# Patient Record
Sex: MALE | Race: WHITE | NOT HISPANIC OR LATINO | Employment: FULL TIME | URBAN - NONMETROPOLITAN AREA
[De-identification: names, ages, dates, MRNs, and addresses within clinical notes are randomized per-mention and may not be internally consistent; named-entity substitution may affect disease eponyms.]

---

## 2017-09-11 ENCOUNTER — HISTORY (OUTPATIENT)
Dept: FAMILY MEDICINE | Facility: OTHER | Age: 42
End: 2017-09-11

## 2017-09-11 ENCOUNTER — OFFICE VISIT - GICH (OUTPATIENT)
Dept: FAMILY MEDICINE | Facility: OTHER | Age: 42
End: 2017-09-11

## 2017-09-11 DIAGNOSIS — Z13.220 ENCOUNTER FOR SCREENING FOR LIPOID DISORDERS: ICD-10-CM

## 2017-09-11 DIAGNOSIS — Z00.00 ENCOUNTER FOR GENERAL ADULT MEDICAL EXAMINATION WITHOUT ABNORMAL FINDINGS: ICD-10-CM

## 2017-09-11 DIAGNOSIS — Z87.898 PERSONAL HISTORY OF OTHER SPECIFIED CONDITIONS (CODE): ICD-10-CM

## 2017-09-11 LAB
A/G RATIO - HISTORICAL: 1.7 (ref 1–2)
ALBUMIN SERPL-MCNC: 4.3 G/DL (ref 3.5–5.7)
ALP SERPL-CCNC: 56 IU/L (ref 34–104)
ALT (SGPT) - HISTORICAL: 27 IU/L (ref 7–52)
ANION GAP - HISTORICAL: 10 (ref 5–18)
AST SERPL-CCNC: 20 IU/L (ref 13–39)
BILIRUB SERPL-MCNC: 0.5 MG/DL (ref 0.3–1)
BUN SERPL-MCNC: 13 MG/DL (ref 7–25)
BUN/CREAT RATIO - HISTORICAL: 16
CALCIUM SERPL-MCNC: 9.6 MG/DL (ref 8.6–10.3)
CHLORIDE SERPLBLD-SCNC: 105 MMOL/L (ref 98–107)
CHOL/HDL RATIO - HISTORICAL: 3.63
CHOLESTEROL TOTAL: 196 MG/DL
CO2 SERPL-SCNC: 23 MMOL/L (ref 21–31)
CREAT SERPL-MCNC: 0.8 MG/DL (ref 0.7–1.3)
GFR IF NOT AFRICAN AMERICAN - HISTORICAL: >60 ML/MIN/1.73M2
GLOBULIN - HISTORICAL: 2.6 G/DL (ref 2–3.7)
GLUCOSE SERPL-MCNC: 121 MG/DL (ref 70–105)
HDLC SERPL-MCNC: 54 MG/DL (ref 23–92)
LDLC SERPL CALC-MCNC: 118 MG/DL
NON-HDL CHOLESTEROL - HISTORICAL: 142 MG/DL
PATIENT STATUS - HISTORICAL: ABNORMAL
POTASSIUM SERPL-SCNC: 3.5 MMOL/L (ref 3.5–5.1)
PROT SERPL-MCNC: 6.9 G/DL (ref 6.4–8.9)
SODIUM SERPL-SCNC: 138 MMOL/L (ref 133–143)
TRIGL SERPL-MCNC: 122 MG/DL

## 2017-09-16 ENCOUNTER — HISTORY (OUTPATIENT)
Dept: FAMILY MEDICINE | Facility: OTHER | Age: 42
End: 2017-09-16

## 2017-09-20 ENCOUNTER — OFFICE VISIT - GICH (OUTPATIENT)
Dept: UROLOGY | Facility: OTHER | Age: 42
End: 2017-09-20

## 2017-09-20 ENCOUNTER — HISTORY (OUTPATIENT)
Dept: UROLOGY | Facility: OTHER | Age: 42
End: 2017-09-20

## 2017-09-20 DIAGNOSIS — Z30.09 ENCOUNTER FOR OTHER GENERAL COUNSELING AND ADVICE ON CONTRACEPTION: ICD-10-CM

## 2017-11-17 ENCOUNTER — HISTORY (OUTPATIENT)
Dept: UROLOGY | Facility: OTHER | Age: 42
End: 2017-11-17

## 2017-11-17 ENCOUNTER — AMBULATORY - GICH (OUTPATIENT)
Dept: UROLOGY | Facility: OTHER | Age: 42
End: 2017-11-17

## 2017-11-17 DIAGNOSIS — Z30.2 ENCOUNTER FOR STERILIZATION: ICD-10-CM

## 2017-12-06 ENCOUNTER — COMMUNICATION - GICH (OUTPATIENT)
Dept: FAMILY MEDICINE | Facility: OTHER | Age: 42
End: 2017-12-06

## 2017-12-07 ENCOUNTER — HISTORY (OUTPATIENT)
Dept: FAMILY MEDICINE | Facility: OTHER | Age: 42
End: 2017-12-07

## 2017-12-07 ENCOUNTER — OFFICE VISIT - GICH (OUTPATIENT)
Dept: FAMILY MEDICINE | Facility: OTHER | Age: 42
End: 2017-12-07

## 2017-12-07 DIAGNOSIS — J01.10 ACUTE FRONTAL SINUSITIS: ICD-10-CM

## 2017-12-27 NOTE — PROGRESS NOTES
Patient Information     Patient Name MRN Sex     Salomón Edwards [id number] Male 1975      Progress Notes by Humble Arroyo MD at 2017  3:30 PM     Author:  Humble Arroyo MD  Service:  (none) Author Type:  Physician     Filed:  2017  3:25 PM  Encounter Date:  2017 Status:  Addendum     :  Humble Arroyo MD (Physician)        Related Notes: Original Note by Humble Arroyo MD (Physician) filed at 2017  3:19 PM            SUBJECTIVE:  Salomón Edwards is a 42 y.o. male who presents for a physical.     Some shoulder pain lately on R. No known injury. He has been taking it easy. Not trying anything else to make it better. Bench pressing seems to make it worse.    Concerned about liver enzymes due to history of alcohol use. He feels his past use has been excessive. Decided to stop and spoke to siblings about it as well given that father was an alcoholic.      PAST MEDICAL HISTORY:  Past Medical History:     Diagnosis  Date     Chronic sprain or strain of lumbar region     hx of flares      Pneumonitis 05     Talus fracture 6/15/04    Question of      SURGICAL HISTORY:  Past Surgical History:      Procedure  Laterality Date     INGUINAL HERNIA REPAIR Left      WISDOM TEETH EXTRACTION         SOCIAL HISTORY:  Social History     Social History        Marital status:       Spouse name: N/A     Number of children:  N/A     Years of education:  N/A     Occupational History      Not on file.     Social History Main Topics        Smoking status:  Former Smoker     Quit date:      Smokeless tobacco:  Never Used     Alcohol use  Not on file     Drug use:  Not on file     Sexual activity:  Not on file     Other Topics  Concern     Not on file      Social History Narrative     .  Lives in . 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    Working on masters of business     Wife - WIllow    Son - Suresh 2007    Son - Tc 2009    Daughter - Sharlene 2017      "        FAMILY HISTORY:  Family History       Problem   Relation Age of Onset     Heart Disease  Father 65     CAD with stents       Alcohol/Drug  Father      Smoker       Cancer  Father      Lymphoma-in remission       Other  Father      Overweight       Liver disease  Father      Heart Disease  Maternal Grandfather 55     MI       Alcohol/Drug  Maternal Grandfather      Smoker       Other  Mother      Fibromyalgia/Scoliosis/Low back DJD       No Known Problems  Sister      No Known Problems  Brother      Diabetes  No Family History      Cancer-colon  No Family History       CURRENT MEDICATIONS:   No current outpatient prescriptions on file.     No current facility-administered medications for this visit.      Medications have been reviewed by me and are current to the best of my knowledge and ability.    ALLERGIES:  Review of patient's allergies indicates no known allergies.    REVIEW OF SYSTEMS:  General: Denies general constitutional problems  Eyes: Denies problems  Ears/Nose/Throat: Denies problems  Cardiovascular: Denies problems  Respiratory: Denies problems  Gastrointestinal: Denies problems  Genitourinary: Denies problems  Musculoskeletal: see above  Skin: Denies problems  Neurologic: Denies problems  Psychiatric: Denies problems    PHQ Depression Screening 9/11/2017   Date of PHQ exam (doc flow) 9/11/2017   1. Lack of interest/pleasure 0 - Not at all   2. Feeling down/depressed 0 - Not at all   PHQ-2 TOTAL SCORE 0   Some recent data might be hidden       OBJECTIVE:  /79  Pulse 73  Ht 1.759 m (5' 9.25\")  Wt 74.8 kg (165 lb)  BMI 24.19 kg/m2  EXAM:   General Appearance: Pleasant, alert, appropriate appearance for age. No acute distress  Eye Exam:  Normal external eye, conjunctiva, lids, cornea. CIPRIANO.  Ear Exam: Normal TM's bilaterally. Normal auditory canals and external ears. Non-tender.  OroPharynx Exam:  Dental hygiene adequate. Normal buccal mucose. Normal pharynx.  Neck Exam:  Supple, no " masses or nodes.  Thyroid Exam: No nodules or enlargement.  Chest/Respiratory Exam: Normal chest wall and respirations. Clear to auscultation.  Cardiovascular Exam: Regular rate and rhythm. S1, S2, no murmur, click, gallop, or rubs.  Gastrointestinal Exam: Soft, non-tender, no masses or organomegaly.  Musculoskeletal Exam: Tender in R anterior shoulder near pectoralis, not tender over biceps tendon  Foot Exam: Left and right foot: good pedal pulses.  Skin: no rash or abnormalities  Neurologic Exam: Nonfocal, symmetric DTRs, normal gross motor, tone coordination and no tremor.  Psychiatric Exam: Alert and oriented - appropriate affect.    Results for orders placed or performed in visit on 09/11/17      COMP METABOLIC PANEL      Result  Value Ref Range    SODIUM 138 133 - 143 mmol/L    POTASSIUM 3.5 3.5 - 5.1 mmol/L    CHLORIDE 105 98 - 107 mmol/L    CO2,TOTAL 23 21 - 31 mmol/L    ANION GAP 10 5 - 18                    GLUCOSE 121 (H) 70 - 105 mg/dL    CALCIUM 9.6 8.6 - 10.3 mg/dL    BUN 13 7 - 25 mg/dL    CREATININE 0.80 0.70 - 1.30 mg/dL    BUN/CREAT RATIO           16                    GFR if African American >60 >60 ml/min/1.73m2    GFR if not African American >60 >60 ml/min/1.73m2    ALBUMIN 4.3 3.5 - 5.7 g/dL    PROTEIN,TOTAL 6.9 6.4 - 8.9 g/dL    GLOBULIN                  2.6 2.0 - 3.7 g/dL    A/G RATIO 1.7 1.0 - 2.0                    BILIRUBIN,TOTAL 0.5 0.3 - 1.0 mg/dL    ALK PHOSPHATASE 56 34 - 104 IU/L    ALT (SGPT) 27 7 - 52 IU/L    AST (SGOT) 20 13 - 39 IU/L   LIPID PANEL      Result  Value Ref Range    CHOLESTEROL,TOTAL 196 <200 mg/dL    TRIGLYCERIDES 122 <150 mg/dL    HDL CHOLESTEROL 54 23 - 92 mg/dL    NON-HDL CHOLESTEROL 142 <145 mg/dl    CHOL/HDL RATIO            3.63 <4.50                    LDL CHOLESTEROL 118 (H) <100 mg/dL    PATIENT STATUS            NON-FASTING                      ASSESSMENT/PLAN:    ICD-10-CM    1. Annual physical exam Z00.00    2. History of alcohol use Z87.898 COMP  METABOLIC PANEL      COMP METABOLIC PANEL   3. Screening, lipid Z13.220 LIPID PANEL      LIPID PANEL     Mr. Edwards's Body mass index is 24.19 kg/(m^2). This is within the normal range for a 42 y.o. Normal range for ages 18+ is between 18.5 and 24.9.    BP Readings from Last 1 Encounters:09/11/17 : 125/79  Mr. Lui blood pressure is out of the normal range for adults. Per JNC-8 guidelines normal adult blood pressure is < 120/80, pre-hypertensive is between 120/80 and 139/89, and hypertension is 140/90 or greater. Risks of hypertension were discussed. Patient's strategy will be to recheck blood pressure in 12 months    HM current. He would like to check lipids since it has been a few years. Results look good,, no need for statin. Recheck 5 years, sooner if desired.     Ordered CMP as well given concerns about liver enzymes. Returned normal as expected.    Demonstrated some stretches for the pectoralis area. Gentle stretching and ROM will help.

## 2017-12-27 NOTE — PROGRESS NOTES
Patient Information     Patient Name MRN Sex     Salomón Edwards [id number] Male 1975      Progress Notes by Dmitri Yanez MD at 2017  8:45 AM     Author:  Dmitri Yanez MD Service:  (none) Author Type:  Physician     Filed:  2017  9:46 AM Encounter Date:  2017 Status:  Signed     :  Dmitri Yanez MD (Physician)            Type of Visit  NPV    Chief Complaint  Elective sterilization    HPI  Mr. Edwards is a 42 y.o. male who presents with desire for irreversible, elective sterilization.    He has 3 kids.    His wife is supportive of this decision.    He has been considering this decision for 2 years.  He denies erectile dysfunction.      Past Medical History  He  has a past medical history of Chronic sprain or strain of lumbar region; Pneumonitis (05); and Talus fracture (6/15/04).  Patient Active Problem List     Diagnosis  Code     Chronic sprain or strain of lumbar region [id number]       Past Surgical History  He  has a past surgical history that includes wisdom teeth extraction and Inguinal hernia repair (Left).    Medications  He has a current medication list which includes the following prescription(s): hydrocodone-acetaminophen (5-325 mg).    Allergies  No Known Allergies    Social History  He  reports that he quit smoking about 2 years ago. He has never used smokeless tobacco.  No drug abuse.    Family History  Family History       Problem   Relation Age of Onset     Heart Disease  Father 65     CAD with stents       Alcohol/Drug  Father      Smoker       Cancer  Father      Lymphoma-in remission       Other  Father      Overweight       Liver disease  Father      Heart Disease  Maternal Grandfather 55     MI       Alcohol/Drug  Maternal Grandfather      Smoker       Other  Mother      Fibromyalgia/Scoliosis/Low back DJD       No Known Problems  Sister      No Known Problems  Brother      Diabetes  No Family History      Cancer-colon  No Family History        Review of  Systems  I personally reviewed the ROS with the patient.    Nursing Notes:   Tamra Ramirez  9/20/2017  8:56 AM  Unsigned  Patient presents to the clinic for vas consult.  Tamra Ramirez LPN........................9/20/2017  8:56 AM    Review of Systems:    Weight loss:    No     Recent fever/chills:  No   Night sweats:   No  Current skin rash:  No   Recent hair loss:  No  Heat intolerance:  No   Cold intolerance:  No  Chest pain:   No   Palpitations:   No  Shortness of breath:  No   Wheezing:   No  Constipation:    No   Diarrhea:   No   Nausea:   No   Vomiting:   No   Kidney/side pain:  No   Back pain:   yes  Frequent headaches:  No   Dizziness:     No  Leg swelling:   No   Calf pain:    No    Parents, brothers or sisters with history of kidney cancer?   No  Parents, brothers or sisters with history of bladder cancer: No      Physical Exam  Vitals:     09/20/17 0857   BP: 126/64   Pulse: 68   Weight: 76.1 kg (167 lb 12.8 oz)     Constitutional: NAD, WDWN.   Head: NCAT  Eyes: Conjunctivae normal  Cardiovascular: Regular rate.  Pulmonary/Chest: Respirations are even and non-labored bilaterally.  Abdominal: Soft. No distension, tenderness, masses or guarding. No CVA tenderness.  Neurological: A + O x 3.  Cranial Nerves II-XII grossly intact.  Extremities: WOJCIECH x 4, Warm. No clubbing.  No cyanosis.    Skin: Pink, warm and dry.  No rashes noted.  Psychiatric:  Normal mood and affect  Genitourinary:  Phallus normal without lesions, testicles descended bilaterally, no masses.    Bilateral vasa palpable    Assessment  Mr. Edwards is a 42 y.o. male who presents today requesting permanent, irreversible surgical sterilization.  He was instructed to trim his pubic hair the night prior with a clippers.  The vasectomy was discussed in detail with the patient today including the risks which are failure of the procedure, infection, bleeding and/or development of chronic testicular pain.      Furthermore, the patient was told he  would remain fertile following the procedure until he provided a semen analysis that met the definition of infertile (no sperm present or <100,000 nonmotile sperm/mL)  This is usually planned for 3 months after the procedure.  The patient expressed understanding and desire to proceed.    Plan  Rx for Norco prior to and after the procedure.     He understands he needs a  the day of the procedure if he chooses to take the narcotic.  Provided vasectomy hand out again outlining the above risks and benefits as well as need for follow up semen analysis

## 2017-12-28 NOTE — PROGRESS NOTES
Patient Information     Patient Name MRN Sex     Salomón Edwards [id number] Male 1975      Progress Notes by Dmitri Yanez MD at 2017 11:15 AM     Author:  Dmitri Yanez MD Service:  (none) Author Type:  Physician     Filed:  2017 12:36 PM Encounter Date:  2017 Status:  Signed     :  Dmitri Yanez MD (Physician)            Preoperative diagnosis  Elective sterilization    Postoperative diagnosis  Elective sterilization    Procedure performed  Bilateral vasectomy    Surgeon  Dmitri Yanez MD    Anesthesia  8 mL lidocaine 2% local injection    Complications  None    EBL  <1 mL    Specimen  Left vas  Right vas    Indications  42 y.o. male agreed to undergo the above named procedure after discussion of the alternatives, risks and benefits.  Informed consent was obtained.      Procedure  The patient was brought to the procedure room and placed in supine position.  His scrotum was prepped with Hibiclens and he was draped in a sterile fashion.  A timeout was performed.    Lidocaine 2% was used to anesthetize the scrotal skin as well as perivasal sheath initially on the patient's left.  A 1 cm skin incision was created with the sharp-tip mosquito and a vas clamp utilized through this incision to grasp the vas.  The left vas was elevated to the skin surface and dissected free of its perivasal sheath.  The vas was transected and the lumen was cauterized both proximally and distally.  A 4-0 chromic suture was utilized to place the proximal vasal portion under the perivasal sheath, such that the 2 ends were divided by the perivasal sheath (fascial interposition).  This portion of the vas was then allowed to drop back into the left hemiscrotum.  The right side was treated next in the same manner as described above.  The skin incision was closed with the 4-0 chromic suture.  The patient tolerated the procedure well.    It was again reiterated to the patient that he would remain fertile for sometime  and should present to the office for a post-vacectomy semen analysis to confirm sterility in 3 months.  The patient again expressed understanding.

## 2017-12-29 NOTE — PATIENT INSTRUCTIONS
Patient Information     Patient Name MRN Salomón Lepe [id number] Male 1975      Patient Instructions by Kerri Dsouza RN at 2017 11:15 AM     Author:  Kerri Dsouza RN Service:  (none) Author Type:  NURS- Registered Nurse     Filed:  2017 10:59 AM Encounter Date:  2017 Status:  Signed     :  Kerri Dsouza RN (NURS- Registered Nurse)            Home Care after Vasectomy   Follow these guidelines for your care after your surgery to help your recovery.    Activity  Limit your activity for the first 5 days after the procedure to light activity.  You may return to work typically in 2 days.  Limit lifting, pushing or pulling to less than 20 pounds until you are no longer sore.   Limit running and long walks until you are no longer sore.   Limit sexual activity for 5 days.    Swelling  Scrotal swelling from the surgery may take weeks to get better. You should call your doctor if the swelling is severe and the scrotum is larger than an orange.  Apply a bag of frozen peas to the scrotum for 15 minutes every 1-2 hours for the first 48 hours when you are awake to limit swelling. It works best to not apply the bag of frozen peas directly to the skin.  Wear a jock strap to support your scrotum and reduce swelling.  Continue wearing the jock strap until you are no longer sore, 1-2 weeks.    Incision care  The single stitch placed in the scrotum will dissolve and does not need to be removed.  A small amount of blood may stain the dressings for up to 2-3 days after the procedure.  For the first few days, apply two or three gauze pads to the site each day and as needed to keep the dressing dry. This will protect the incision and help keep your clothes clean.  When you are no longer having any drainage, stop using the gauze pads over the site.    Bathing or showering  You may shower after the procedure but do not scrub the stitch area.  Allow the water to wash over the stitch  and do not scrub the area. Dry the site gently by patting it with a clean towel.  Tub baths should be avoided for 7 days after surgery.  Swimming should be avoided for 14 days after surgery.      Pain control  You were provided with a pain medication prescription during the clinic consultation, please use this as needed.  Also, please take ibuprofen as we discussed in clinic.  Pain most often is eased after 5 to 7 days.    Sexual activity  Please avoid ejaculating for 1 week after procedure.    Follow up  Following this procedure you are still considered fertile and would be able to impregnate your partner.  You should have a semen analysis performed 3 months or greater after your procedure to confirm sterility.  Ideally you would ejaculate about 2-3 dozen times following the vasectomy and prior to semen collection.  Use birth control until the semen analysis is confirmed sterile.    Use contraceptives until this is confirmed to prevent pregnancy.    Contacts  General Questions: [phone]  Appointments:  [phone]  Emergencies:  [phone]    When to call your doctor  Call the urology office if you have any of the following:   Severe swelling, larger than the size of an orange    A large amount of fluid drainage that soaks several pads per day   Pain that is not controlled with pain medicine, jock strap and use of frozen peas   Any signs of infection such as the following:    -Redness or tenderness around the incision site worsening after 2-3 days or   -Pus type drainage from the incision or   -Fever of greater than 101 degrees F    Also call the office if you have any questions or concerns about your care.

## 2017-12-29 NOTE — PATIENT INSTRUCTIONS
Patient Information     Patient Name MRN Sex Salomón Green [id number] Male 1975      Patient Instructions by Humble Arroyo MD at 2017  3:30 PM     Author:  Humble Arroyo MD Service:  (none) Author Type:  Physician     Filed:  2017  3:28 PM Encounter Date:  2017 Status:  Signed     :  Humble Arroyo MD (Physician)            Letter with labs  Tetanus shot due in 7 years

## 2017-12-30 NOTE — NURSING NOTE
Patient Information     Patient Name MRN Sex     Salomón Edwards [id number] Male 1975      Nursing Note by Kerri Dsouza RN at 2017 11:15 AM     Author:  Kerri Dsouza RN Service:  (none) Author Type:  NURS- Registered Nurse     Filed:  2017 11:47 AM Encounter Date:  2017 Status:  Signed     :  Kerri Dsouza RN (NURS- Registered Nurse)            Vasectomy  Per verbal order read back by Dmitri Yanez MD to prep patient for vasectomy.  Patient positioned in supine position, perineum area prepped with chlorhexidene Gluconate and patient draped per sterile technique.    Lidocaine 2%  Lot #: [id number]  Expiration date: 2019  : [employer]  NDC: 7773-3573-16    [place] Protocol    A. Pre-procedure verification complete yes  1-relevant information / documentation available, reviewed and properly matched to the patient; 2-consent accurate and complete, 3-equipment and supplies available    B. Site marking complete N/A  Site marked if not in continuous attendance with patient    C. TIME OUT completed yes  Time Out was conducted just prior to starting procedure to verify the eight required elements: 1-patient identity, 2-consent accurate and complete, 3-position, 4-correct side/site marked (if applicable), 5-procedure, 6-relevant images / results properly labeled and displayed (if applicable), 7-antibiotics / irrigation fluids (if applicable), 8-safety precautions.    After procedure perineum area rinsed. Semen analysis container given to patient. Patient reminded to have a semen analysis performed 3 months after the procedure to confirm sterility and to ejaculate about 1-2 dozen times following the vasectomy and prior to semen collection. Discharge instructions reviewed with patient. Patient verbalized understanding of discharge instructions and discharged ambulatory.

## 2017-12-30 NOTE — NURSING NOTE
Patient Information     Patient Name MRN Sex Salomón Green [id number] Male 1975      Nursing Note by Tamra Ramirez at 2017  8:45 AM     Author:  Tamra Ramirez Service:  (none) Author Type:  (none)     Filed:  2017  9:09 AM Encounter Date:  2017 Status:  Signed     :  Tamra Ramirez            Patient presents to the clinic for vas consult.  Tamra Ramirez LPN........................2017  8:56 AM    Review of Systems:    Weight loss:    No     Recent fever/chills:  No   Night sweats:   No  Current skin rash:  No   Recent hair loss:  No  Heat intolerance:  No   Cold intolerance:  No  Chest pain:   No   Palpitations:   No  Shortness of breath:  No   Wheezing:   No  Constipation:    No   Diarrhea:   No   Nausea:   No   Vomiting:   No   Kidney/side pain:  No   Back pain:   yes  Frequent headaches:  No   Dizziness:     No  Leg swelling:   No   Calf pain:    No    Parents, brothers or sisters with history of kidney cancer?   No  Parents, brothers or sisters with history of bladder cancer: No

## 2017-12-30 NOTE — NURSING NOTE
Patient Information     Patient Name MRN Sex Salomón Green [id number] Male 1975      Nursing Note by Griselda Gurrola at 2017  3:30 PM     Author:  Griselda Gurrola Service:  (none) Author Type:  (none)     Filed:  2017  2:58 PM Encounter Date:  2017 Status:  Signed     :  Griselda Gurrola            Salomón Edwards is a 42 y.o. male presenting for a physical.  Griselda Gurrola LPN 2017 2:49 PM

## 2018-01-25 VITALS
RESPIRATION RATE: 16 BRPM | HEIGHT: 70 IN | HEART RATE: 73 BPM | WEIGHT: 165 LBS | DIASTOLIC BLOOD PRESSURE: 79 MMHG | HEART RATE: 76 BPM | BODY MASS INDEX: 24.28 KG/M2 | BODY MASS INDEX: 24.44 KG/M2 | SYSTOLIC BLOOD PRESSURE: 125 MMHG | WEIGHT: 169.6 LBS | HEIGHT: 69 IN

## 2018-01-25 VITALS — HEART RATE: 68 BPM | SYSTOLIC BLOOD PRESSURE: 126 MMHG | WEIGHT: 167.8 LBS | DIASTOLIC BLOOD PRESSURE: 64 MMHG

## 2018-02-08 DIAGNOSIS — Z30.2 ENCOUNTER FOR VASECTOMY: Primary | ICD-10-CM

## 2018-02-09 VITALS
DIASTOLIC BLOOD PRESSURE: 82 MMHG | TEMPERATURE: 97.8 F | HEART RATE: 76 BPM | BODY MASS INDEX: 24.39 KG/M2 | HEIGHT: 70 IN | WEIGHT: 170.38 LBS | SYSTOLIC BLOOD PRESSURE: 136 MMHG

## 2018-02-12 NOTE — PATIENT INSTRUCTIONS
Patient Information     Patient Name MRN Sex Salomón Silva [id number] Male 1975      Patient Instructions by Aura Lyman NP at 2017 10:35 AM     Author:  Aura Lyman NP  Service:  (none) Author Type:  PHYS- Nurse Practitioner     Filed:  2017 10:35 AM  Encounter Date:  2017 Status:  Addendum     :  Aura Lyman NP (PHYS- Nurse Practitioner)        Related Notes: Original Note by Aura Lyman NP (PHYS- Nurse Practitioner) filed at 2017 10:35 AM               Index Telugu Related topics   Sinusitis   ________________________________________________________________________  KEY POINTS    Sinusitis is swelling and irritation of the linings of the sinuses, the hollow spaces in the bones of your face and front of your skull.    You may need to take medicine for your stuffy nose, pain, infection, or swelling.    Use saline nasal sprays or rinses to help wash out nasal passages if you have a sinus infection. A humidifier can add moisture to the air also.  ________________________________________________________________________  What is sinusitis?  Sinusitis is swelling and irritation of the linings of the sinuses. The sinuses are hollow spaces in the bones of your face and front of your skull. They connect with the nose through small openings. Like the nose, they are lined with tissue (membranes) that make mucus. Mucus drains through the small openings to the nose.  What is the cause?  The passageways from the sinuses to the nose are very narrow. When drainage of mucus from the sinuses is blocked, the sinuses get swollen and irritated. They may also become infected with bacteria, a virus, or even fungus. Allergies or irritation from pollen, mold, dust, or smoke can also cause swelling of the sinuses. Sometimes a tooth infection spreads to the sinuses.  You may be more likely to get sinus congestion and infections if you have:    Severe or untreated seasonal or year-round  allergies    Injured the bones in your nose    A deformity of the nose that causes the sinuses not to drain properly    Small growths called polyps in the sinuses that partially block the sinus openings  What are the symptoms?  Symptoms may include:    Feeling of fullness or pressure in your face or head    A headache that is most painful when you first wake up in the morning or when you bend over and put your head down    Pain in your face    Aching in the upper jaw and teeth    Runny or stuffy nose    Cough, especially at night    Fluid draining down the back of your throat (postnasal drip)    Sore throat, especially in the morning or evening  How is it diagnosed?  Your healthcare provider will ask about your symptoms and medical history and examine you. Tests are often not needed but may include:    X-ray of your sinuses    CT scan, which uses X-rays and a computer to show detailed pictures of the sinuses  How is it treated?  Several kinds of medicine may help:    Nonprescription pain medicine, such as acetaminophen, ibuprofen, or naproxen. Read the label and take as directed. Unless recommended by your healthcare provider, you should not take these medicines for more than 10 days.    Nonsteroidal anti-inflammatory medicines (NSAIDs), such as ibuprofen, naproxen, and aspirin, may cause stomach bleeding and other problems. These risks increase with age.    Acetaminophen may cause liver damage or other problems. Unless recommended by your provider, don't take more than 3000 milligrams (mg) in 24 hours. To make sure you don t take too much, check other medicines you take to see if they also contain acetaminophen. Ask your provider if you need to avoid drinking alcohol while taking this medicine.    Decongestants pills or nasal sprays to reduce swelling in your nose and sinuses and lessen the amount of mucus. Use decongestants as directed. If you are using a nonprescription nasal-spray decongestant, generally you  should not use it for more than 3 days. After 3 days it may make your symptoms worse. Ask your healthcare provider if it is OK for you to use a nasal spray decongestant longer than this.    Antihistamine tablets or a nasal spray to treat the allergies during your allergy season or, in some cases, year-round. Antihistamines block the effect of a chemical your body makes when you have an allergic reaction.    Antibiotics, if your provider thinks you might have a sinus infection    Steroid nasal spray if your provider thinks it may help clear your sinuses  If sinusitis is still a problem despite treatment, you may be referred to an allergy specialist or an ear, nose, and throat (ENT) specialist. The allergy specialist will check for and help treat your allergies to lessen the chance of having sinusitis. The ENT specialist will check for polyps or a deformed bone that may be blocking your sinuses. You may need surgery to create an extra or enlarged passageway to help your sinuses drain more easily.  Depending on what caused the sinusitis and how severe it is, it may last for days or weeks. For most cases of sinusitis, the symptoms get better gradually over 3 to 10 days.  How can I take care of myself?  Follow the full course of treatment prescribed by your healthcare provider. In addition:    If you are taking an antibiotic, take all of it as directed by your provider. If you stop taking the medicine when your symptoms are gone but before you have taken all of the medicine, symptoms may come back.    Don t smoke, and stay away from others who are smoking.    If you have allergies, try to avoid the things you are allergic to, like animal dander. Use medicine to keep your nose and sinuses open.    Use a humidifier to put more moisture in the air. This can help to open blocked sinuses and relieve pain. Avoid steam vaporizers because they can cause burns. Be sure to keep the humidifier clean, as recommended in the  's instructions. It's important to keep bacteria and mold from growing in the water container.    Use saline nasal sprays or rinses to help wash out nasal passages if you have a sinus infection. You may use the sprays also to prevent infections.    Get plenty of rest.    Drink more fluids to keep the mucus as thin as possible so your sinuses can drain more easily.    Raise the head of your bed slightly or sleep on extra pillows to help your sinuses drain.    Put warm, moist cloths on painful areas.  Ask your healthcare provider:    How and when you will get your test results    How long it will take to recover    If there are activities you should avoid and when you can return to your normal activities    How to take care of yourself at home    What symptoms or problems you should watch for and what to do if you have them  Make sure you know when you should come back for a checkup. Keep all appointments for provider visits or tests.  How can I help prevent sinusitis?    Treat your colds and allergies promptly. Use decongestants as soon as you start having symptoms, and before you fly, travel to high altitudes, or swim in deep water.    If you smoke, try to quit. Talk to your healthcare provider about ways to quit smoking.  Developed by [employer].  Adult Advisor 2017.2 published by [employer].  Last modified: 2016-03-23  Last reviewed: 2015-08-27  This content is reviewed periodically and is subject to change as new health information becomes available. The information is intended to inform and educate and is not a replacement for medical evaluation, advice, diagnosis or treatment by a healthcare professional.  References   Adult Advisor 2017.2 Index    Copyright   2017 [employer], a division of [employer] All rights reserved.

## 2018-02-12 NOTE — PROGRESS NOTES
Patient Information     Patient Name MRN Sex     Salomón Edwards [id number] Male 1975      Progress Notes by Aura Lyman NP at 2017 10:00 AM     Author:  Aura Lyman NP Service:  (none) Author Type:  PHYS- Nurse Practitioner     Filed:  2017  2:47 PM Encounter Date:  2017 Status:  Signed     :  Aura Lyman NP (PHYS- Nurse Practitioner)            SUBJECTIVE:    Salomón Edwards is a 42 y.o. male who presents for follow-up on 6 weeks of sinus pain and pressure, has had sinus infections in the past and responded well to treatment with antibiotics.  His not used any nasal saline or nasal corticosteroid. Denies any recent colds. May have allergy issues. Denies any history of sinus infections. No fever.    HPI    No Known Allergies,   Family History       Problem   Relation Age of Onset     Heart Disease  Father 65     CAD with stents       Alcohol/Drug  Father      Smoker       Cancer  Father      Lymphoma-in remission       Other  Father      Overweight       Liver disease  Father      Heart Disease  Maternal Grandfather 55     MI       Alcohol/Drug  Maternal Grandfather      Smoker       Other  Mother      Fibromyalgia/Scoliosis/Low back DJD       No Known Problems  Sister      No Known Problems  Brother      Diabetes  No Family History      Cancer-colon  No Family History    ,   No current outpatient prescriptions on file prior to visit.     No current facility-administered medications on file prior to visit.    ,   Current Outpatient Prescriptions:      cefpodoxime (VANTIN) 200 mg tablet, Take 1 tablet by mouth 2 times daily for 10 days., Disp: 20 tablet, Rfl: 0     fluticasone (50 mcg per actuation) nasal solution (FLONASE), Inhale 2 Sprays into both nostrils once daily., Disp: 1 Bottle, Rfl: 0     ibuprofen (ADVIL; MOTRIN) 800 mg tablet, Take 800 mg by mouth 3 times daily if needed., Disp: , Rfl: 0  Medications have been reviewed by me and are current to the best of my  "knowledge and ability.,   Past Medical History:     Diagnosis  Date     Chronic sprain or strain of lumbar region     hx of flares      Pneumonitis 2/17/05     Talus fracture 6/15/04    Question of    ,   Patient Active Problem List       Diagnosis  Date Noted     Chronic sprain or strain of lumbar region       hx of flares      ,   Past Surgical History:      Procedure  Laterality Date     INGUINAL HERNIA REPAIR Left      WISDOM TEETH EXTRACTION      and   Social History      Substance Use Topics        Smoking status:  Former Smoker     Quit date: 2015     Smokeless tobacco:  Never Used     Alcohol use  No       REVIEW OF SYSTEMS:  Review of Systems   Constitutional: Negative.    HENT: Positive for congestion and sinus pain.    Eyes: Negative.    Respiratory: Negative.    Cardiovascular: Negative.    Gastrointestinal: Negative.    Genitourinary: Negative.    Musculoskeletal: Negative.    Skin: Negative.    Neurological: Negative.    Endo/Heme/Allergies: Negative.    Psychiatric/Behavioral: Negative.        OBJECTIVE:  /82  Pulse 76  Temp 97.8  F (36.6  C) (Temporal)  Ht 1.77 m (5' 9.69\")  Wt 77.3 kg (170 lb 6 oz)  BMI 24.67 kg/m2    EXAM:   Physical Exam   Constitutional: He is oriented to person, place, and time and well-developed, well-nourished, and in no distress.   HENT:   Head: Normocephalic and atraumatic.   Mouth/Throat: Oropharynx is clear and moist.   Bilateral maxillary and frontal sinus pain and pressure no visible erythema or edema   Neck: Normal range of motion. Neck supple.   Cardiovascular: Normal rate.    Pulmonary/Chest: Effort normal.   Musculoskeletal: Normal range of motion.   Lymphadenopathy:     He has no cervical adenopathy.   Neurological: He is alert and oriented to person, place, and time. Gait normal.   Skin: Skin is warm and dry.   Psychiatric: Mood, memory, affect and judgment normal.   Nursing note and vitals reviewed.      ASSESSMENT/PLAN:    ICD-10-CM    1. Acute frontal " sinusitis, recurrence not specified J01.10 cefpodoxime (VANTIN) 200 mg tablet      fluticasone (50 mcg per actuation) nasal solution (FLONASE)        Plan:  We'll treat with cephalosporin    Encouraged nasal saline to promote flow    Try nasal corticosteroid for the next 2-3 weeks to decrease pressure and promote flow and drainage    Discussed expected course--- return to clinic if no improvement or if worsening

## 2018-02-12 NOTE — NURSING NOTE
"Patient Information     Patient Name MRN Salomón Lepe [id number] Male 1975      Nursing Note by Makenna Santiago at 2017 10:00 AM     Author:  Makenna Santiago Service:  (none) Author Type:  (none)     Filed:  2017 10:25 AM Encounter Date:  2017 Status:  Signed     :  Makenna Santiago            Patient presents to clinic today for sinus pressure x6 weeks. He states it started after he was on a long flight. He states it feels as though there is a \"block of wood\" on his head.    Makenna Santiago LPN...................2017  10:13 AM        "

## 2018-02-12 NOTE — TELEPHONE ENCOUNTER
"Patient Information     Patient Name MRN Sex Salomón Green [id number] Male 1975      Telephone Encounter by Amber Seo RN at 2017 12:09 PM     Author:  Amber Seo RN Service:  (none) Author Type:  NURS- Registered Nurse     Filed:  2017 12:23 PM Encounter Date:  2017 Status:  Signed     :  Amber Seo RN (NURS- Registered Nurse)            Patient calls, \"I have this thing going on for the past 6 weeks. It got better and then it came back. Pressure and heat on forehead. I do have a history of sinus infection. I used to get nose bleeds with sinus infections but I haven't this time. Denies fever. No headache. 'Just sinus pressure'. Recommended patient be seen today, however no available openings in clinic today. This writer recommended [facility] however patient states,[employer] insurance has a problem with covering a [facility] visit'. Patient transferred to scheduling to set up next available for tomorrow. Patient states he will see any provider. Patient to call back and or go to ED  if symptoms change/worsen in the interim. \"I will'. Amber Seo RN ....................  2017   12:23 PM          "

## 2018-02-16 ENCOUNTER — DOCUMENTATION ONLY (OUTPATIENT)
Dept: FAMILY MEDICINE | Facility: OTHER | Age: 43
End: 2018-02-16

## 2018-02-16 RX ORDER — IBUPROFEN 800 MG/1
800 TABLET, FILM COATED ORAL 3 TIMES DAILY PRN
COMMUNITY
Start: 2017-11-17 | End: 2019-01-15

## 2018-02-16 RX ORDER — FLUTICASONE PROPIONATE 50 MCG
2 SPRAY, SUSPENSION (ML) NASAL DAILY
COMMUNITY
Start: 2017-12-07 | End: 2019-01-15

## 2018-07-23 NOTE — PROGRESS NOTES
Patient Information     Patient Name  Salomón Edwards MRN  [id number] Sex  Male   1975      Letter by Humble Arroyo MD at      Author:  Humble Arroyo MD Service:  (none) Author Type:  (none)    Filed:   Encounter Date:  2017 Status:  (Other)           Salomón Edwards  [address]  [facility] [address]          2017    Dear Mr. Edwards:    Your labs are included below. Everything looks good. The liver tests, electrolytes, and kidney tests are normal. Your blood sugar is normal for a non-fasting value.     Cholesterol values look good. As we discussed, cholesterol should be rechecked in 5 years, although we can certainly check the cholesterol more frequently if you desire.    Please contact me if you have any questions.    Sincerely,      Humble Arroyo MD  Reviewed and electronically signed by provider.    Results for orders placed or performed in visit on 17      COMP METABOLIC PANEL      Result  Value Ref Range    SODIUM 138 133 - 143 mmol/L    POTASSIUM 3.5 3.5 - 5.1 mmol/L    CHLORIDE 105 98 - 107 mmol/L    CO2,TOTAL 23 21 - 31 mmol/L    ANION GAP 10 5 - 18                    GLUCOSE 121 70 - 105 mg/dL    CALCIUM 9.6 8.6 - 10.3 mg/dL    BUN 13 7 - 25 mg/dL    CREATININE 0.80 0.70 - 1.30 mg/dL    ALBUMIN 4.3 3.5 - 5.7 g/dL    PROTEIN,TOTAL 6.9 6.4 - 8.9 g/dL    GLOBULIN                  2.6 2.0 - 3.7 g/dL    BILIRUBIN,TOTAL 0.5 0.3 - 1.0 mg/dL    ALK PHOSPHATASE 56 34 - 104 IU/L    ALT (SGPT) 27 7 - 52 IU/L    AST (SGOT) 20 13 - 39 IU/L   LIPID PANEL      Result  Value Ref Range    CHOLESTEROL,TOTAL 196 <200 mg/dL    TRIGLYCERIDES 122 <150 mg/dL    HDL CHOLESTEROL 54 23 - 92 mg/dL    NON-HDL CHOLESTEROL 142 <145 mg/dl    CHOL/HDL RATIO            3.63 <4.50                    LDL CHOLESTEROL 118 <100 mg/dL    PATIENT STATUS            NON-FASTING

## 2019-01-15 ENCOUNTER — HOSPITAL ENCOUNTER (OUTPATIENT)
Dept: GENERAL RADIOLOGY | Facility: OTHER | Age: 44
Discharge: HOME | End: 2019-01-15
Attending: FAMILY MEDICINE | Admitting: FAMILY MEDICINE
Payer: COMMERCIAL

## 2019-01-15 ENCOUNTER — OFFICE VISIT (OUTPATIENT)
Dept: FAMILY MEDICINE | Facility: OTHER | Age: 44
End: 2019-01-15
Attending: FAMILY MEDICINE
Payer: COMMERCIAL

## 2019-01-15 VITALS
WEIGHT: 173.8 LBS | DIASTOLIC BLOOD PRESSURE: 88 MMHG | SYSTOLIC BLOOD PRESSURE: 128 MMHG | RESPIRATION RATE: 16 BRPM | BODY MASS INDEX: 25.16 KG/M2 | HEART RATE: 66 BPM

## 2019-01-15 DIAGNOSIS — M25.511 RIGHT ANTERIOR SHOULDER PAIN: Primary | ICD-10-CM

## 2019-01-15 DIAGNOSIS — M25.511 RIGHT ANTERIOR SHOULDER PAIN: ICD-10-CM

## 2019-01-15 DIAGNOSIS — M54.50 CHRONIC RIGHT-SIDED LOW BACK PAIN WITHOUT SCIATICA: ICD-10-CM

## 2019-01-15 DIAGNOSIS — G89.29 CHRONIC RIGHT-SIDED LOW BACK PAIN WITHOUT SCIATICA: ICD-10-CM

## 2019-01-15 PROCEDURE — 73030 X-RAY EXAM OF SHOULDER: CPT | Mod: RT

## 2019-01-15 PROCEDURE — 99214 OFFICE O/P EST MOD 30 MIN: CPT | Performed by: FAMILY MEDICINE

## 2019-01-15 ASSESSMENT — PAIN SCALES - GENERAL: PAINLEVEL: MILD PAIN (2)

## 2019-01-15 NOTE — PATIENT INSTRUCTIONS
Ordered MRI to check on shoulder  Ordered MRI to assess lumbar pain  Likely physical therapy depending on results

## 2019-01-15 NOTE — NURSING NOTE
Pt presents to clinic today for ongoing right shoulder pain. Pt states no improvement over the last year.      Medication Reconciliation: complete  Kerri Ayers LPN
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
## 2019-01-17 ENCOUNTER — HOSPITAL ENCOUNTER (OUTPATIENT)
Dept: GENERAL RADIOLOGY | Facility: OTHER | Age: 44
End: 2019-01-17
Attending: FAMILY MEDICINE
Payer: COMMERCIAL

## 2019-01-17 ENCOUNTER — HOSPITAL ENCOUNTER (OUTPATIENT)
Dept: MRI IMAGING | Facility: OTHER | Age: 44
End: 2019-01-17
Attending: FAMILY MEDICINE
Payer: COMMERCIAL

## 2019-01-17 ENCOUNTER — HOSPITAL ENCOUNTER (OUTPATIENT)
Dept: MRI IMAGING | Facility: OTHER | Age: 44
Discharge: HOME | End: 2019-01-17
Attending: FAMILY MEDICINE | Admitting: RADIOLOGY
Payer: COMMERCIAL

## 2019-01-17 DIAGNOSIS — M54.50 CHRONIC RIGHT-SIDED LOW BACK PAIN WITHOUT SCIATICA: ICD-10-CM

## 2019-01-17 DIAGNOSIS — M25.511 RIGHT ANTERIOR SHOULDER PAIN: ICD-10-CM

## 2019-01-17 DIAGNOSIS — G89.29 CHRONIC RIGHT-SIDED LOW BACK PAIN WITHOUT SCIATICA: ICD-10-CM

## 2019-01-17 PROCEDURE — 25000125 ZZHC RX 250: Performed by: RADIOLOGY

## 2019-01-17 PROCEDURE — 23350 INJECTION FOR SHOULDER X-RAY: CPT

## 2019-01-17 PROCEDURE — A9575 INJ GADOTERATE MEGLUMI 0.1ML: HCPCS | Performed by: RADIOLOGY

## 2019-01-17 PROCEDURE — 72148 MRI LUMBAR SPINE W/O DYE: CPT

## 2019-01-17 PROCEDURE — 73222 MRI JOINT UPR EXTREM W/DYE: CPT | Mod: RT

## 2019-01-17 PROCEDURE — 25500064 ZZH RX 255 OP 636: Performed by: RADIOLOGY

## 2019-01-17 RX ORDER — GADOTERATE MEGLUMINE 376.9 MG/ML
0.1 INJECTION INTRAVENOUS ONCE
Status: COMPLETED | OUTPATIENT
Start: 2019-01-17 | End: 2019-01-17

## 2019-01-17 RX ADMIN — LIDOCAINE HYDROCHLORIDE 5 ML: 10 INJECTION, SOLUTION INFILTRATION; PERINEURAL at 15:44

## 2019-01-17 RX ADMIN — Medication 5 ML: at 15:44

## 2019-01-17 RX ADMIN — GADOTERATE MEGLUMINE 0.1 ML: 376.9 INJECTION INTRAVENOUS at 15:44

## 2019-01-18 ENCOUNTER — TELEPHONE (OUTPATIENT)
Dept: FAMILY MEDICINE | Facility: OTHER | Age: 44
End: 2019-01-18

## 2019-01-18 DIAGNOSIS — M51.369 DDD (DEGENERATIVE DISC DISEASE), LUMBAR: Primary | ICD-10-CM

## 2019-01-18 DIAGNOSIS — M75.111 PARTIAL TEAR OF RIGHT ROTATOR CUFF: ICD-10-CM

## 2019-01-18 NOTE — TELEPHONE ENCOUNTER
Please let him know that MRI of the shoulder shows multiple partial rotator cuff tears and an old labral tear. He should see orthopedic surgery about surgery for these findings. PT may be indicated, but likely they can just perform surgery.    Back MRI shows significant narrowing of the vertebral space and some disc herniation mostly at L5-S1, less so at L4-5.This seems like a good explanation for his pain. I would try PT first. Placed referral. Injections could be considered vs discussion with a surgeon depending on how PT goes.     I will wait to sign results, if he signs up for MyChart I can release the results to him so he can read the findings this weekend.

## 2019-01-23 ENCOUNTER — HOSPITAL ENCOUNTER (OUTPATIENT)
Dept: PHYSICAL THERAPY | Facility: OTHER | Age: 44
Setting detail: THERAPIES SERIES
End: 2019-01-23
Attending: FAMILY MEDICINE
Payer: COMMERCIAL

## 2019-01-23 DIAGNOSIS — M51.369 DDD (DEGENERATIVE DISC DISEASE), LUMBAR: ICD-10-CM

## 2019-01-23 PROCEDURE — 97110 THERAPEUTIC EXERCISES: CPT | Mod: GP

## 2019-01-23 PROCEDURE — 97161 PT EVAL LOW COMPLEX 20 MIN: CPT | Mod: GP

## 2019-01-24 NOTE — PROGRESS NOTES
" 01/23/19 1000   General Information   Type of Visit Initial OP Ortho PT Evaluation   Start of Care Date 01/23/19   Referring Physician Dr. Arroyo   Orders Evaluate and Treat   Date of Order 01/18/19   Insurance Type Other   Insurance Comments/Visits Authorized [employer]   Medical Diagnosis Lumbar degenerative disc disease   Surgical/Medical history reviewed Yes   Precautions/Limitations no known precautions/limitations   Body Part(s)   Body Part(s) Lumbar Spine/SI   Presentation and Etiology   Pertinent history of current problem (include personal factors and/or comorbidities that impact the POC) Patient is a 43 y.o male whom presents to PT with c/o a long history of insidious onset LBP.. Patient is also having R shoulder pain. He will have an orthopedic consult with Dr. Burkett at [facility] in two weeks. He c/o a long history of LBP that has at times been both right and left side. Currently it is just the right side that is symptomatic. He is a very active individual and completes an aggressive work out routine with weight lifting and cardio. Reports that his back will \"lock\" at times and then require several days to loosen. He denies any buttock or leg pain, no numbness or tingling. Has no c/o LE weakness, no bowel or bladder change. He is otherwise very healthy. He has recieved both chiropractic and PT treatment in the past which were :somewhat\" helpful for a short period of time. He is mainly intersted in recieving a HEP he can use to self treat his back. He has not done much core strengthening. He has difficulty sleeping due to pain in preferred sleep position and pain with longer term standing.   Impairments A. Pain;D. Decreased ROM;E. Decreased flexibility;F. Decreased strength and endurance;K. Numbness;L. Tingling   Functional Limitations perform desired leisure / sports activities;perform required work activities   Symptom Location Right lumbar spine   How/Where did it occur From insidious onset "   Chronicity Chronic   Pain rating (0-10 point scale) Best (/10);Worst (/10)   Best (/10) 0   Worst (/10) 7/10   Pain quality C. Aching;D. Burning;G. Cramping   Frequency of pain/symptoms B. Intermittent   Pain/symptoms are: Worse during the night   Pain/symptoms exacerbated by G. Certain positions;L. Work tasks;M. Other   Pain exacerbation comment Standin for long periods   Pain/symptoms eased by C. Rest;E. Changing positions;I. OTC medication(s);H. Cold   Progression of symptoms since onset: Unchanged   Current / Previous Interventions   Diagnostic Tests: MRI   MRI Results Results   MRI results L4- L5 herniation, L5-S1 stenosis   Prior Level of Function   Prior Level of Function-Mobility NML   Prior Level of Function-ADLs NML   Current Level of Function   Current Community Support Family/friend caregiver   Patient role/employment history A. Employed   Living environment [facility]   Fall Risk Screen   Fall screen completed by PT   Have you fallen 2 or more times in the past year? No   Have you fallen and had an injury in the past year? No   Is patient a fall risk? No   Abuse Screen (yes response referral indicated)   Feels Unsafe at Home or Work/School no   Feels Threatened by Someone no   Does Anyone Try to Keep You From Having Contact with Others or Doing Things Outside Your Home? no   Physical Signs of Abuse Present no   Lumbar Spine/SI Objective Findings   Observation No deformity   Integumentary NML   Posture NML   Gait/Locomotion NML   Flexion ROM To ankles   Extension ROM Slight limitation   Right Side Bending ROM NML   Left Side Bending ROM Slight limitation   Repeated Extension-Standing ROM No change   Repeated Flexion-Standing ROM No change   Lumbar ROM Comment Limited back extension   Pelvic Screen NML    Hip Screen NML   Transversus Abdominus Strength (Martin Leg Lowering-deg) 3+- 4-   Hip Flexion (L2) Strength 5   Hip Abduction Strength 5   Hip Adduction Strength 5   Hip Extension Strength 5    Knee Flexion Strength 5   Knee Extension (L3) Strength 5   Ankle Dorsiflexion (L4) Strength 5   Great Toe Extension (L5) Strength 5   Ankle Plantar Flexion (S1) Strength 5   Lumbar/Hip/Knee/Foot Strength Comments Good strength and muscle tone   Hamstring Flexibility Good B    Hip Flexor Flexibility B tightness   Quadricep Flexibility B tightness   Obers Flexibility NML   Piriformis Flexibility Tight R   SLR NEG   Wilfred Test NEG   Crossover SLR NEG   Slump Test NEG   Segmental Mobility Noted hypomobility of L3-5 through L5-S1   Sensation Testing Intact   Neurological Testing Comments Intact   Palpation 2-3/4 tenderness of the L4-5 and L5-S1 R facet joints and intervertebral spaces    Planned Therapy Interventions   Planned Therapy Interventions joint mobilization;manual therapy;strengthening;ROM;stretching   Planned Modality Interventions   Planned Modality Interventions Ultrasound;Cryotherapy;Electrical stimulation;Hot packs   Clinical Impression   Criteria for Skilled Therapeutic Interventions Met yes, treatment indicated   PT Diagnosis Right LBP due to stenosis and disc degeneration    Influenced by the following impairments Pain, limited mobility   Functional limitations due to impairments Unable to stand for functional lengths of time, sleep dysfunction   Clinical Presentation Stable/Uncomplicated   Clinical Decision Making (Complexity) Low complexity   Therapy Frequency 2 times/Week  (1-2 X per week)   Predicted Duration of Therapy Intervention (days/wks) 8 weeks   Risk & Benefits of therapy have been explained Yes   Patient, Family & other staff in agreement with plan of care Yes   Clinical Impression Comments Patient is overall very healthy with very good strength   Education Assessment   Preferred Learning Style Listening;Reading;Demonstration;Pictures/video   Barriers to Learning No barriers   ORTHO GOALS   PT Ortho Eval Goals 1;2;3;4   Ortho Goal 1   Goal Identifier Pain   Goal Description Patient will  report he is able to complete all NML home and work tasks in a standing position without back pain above 2-3/10 in 8 weeks   Target Date 03/23/19   Ortho Goal 2   Goal Identifier Mobility   Goal Description Patient will demo nml lumbar and hip mobility supporting tasks requiring full back flex and decreasing strain to dgenerative lumbar structures in 8 weeks   Target Date 03/23/19   Ortho Goal 3   Goal Identifier Sleep   Goal Description Patient will report he is able to sleep in desired positions without waking due to pain in 8-12 weeks   Target Date 04/23/19   Ortho Goal 4   Goal Identifier Gait   Goal Description Patient will be able to walk as desired for physical fitness and health without limit due to back pain in 8-12 weeks   Target Date 04/23/19   Total Evaluation Time   PT Eval, Low Complexity Minutes ([id number]) 05

## 2019-01-29 ENCOUNTER — TELEPHONE (OUTPATIENT)
Dept: FAMILY MEDICINE | Facility: OTHER | Age: 44
End: 2019-01-29

## 2019-01-29 DIAGNOSIS — M75.111 PARTIAL TEAR OF RIGHT ROTATOR CUFF: Primary | ICD-10-CM

## 2019-01-29 DIAGNOSIS — M51.369 DDD (DEGENERATIVE DISC DISEASE), LUMBAR: ICD-10-CM

## 2019-01-29 NOTE — TELEPHONE ENCOUNTER
"Pt. Calling and requesting MRI results.  States \"You can tell them that I have mychart set up now.\"  "

## 2019-01-30 ENCOUNTER — HOSPITAL ENCOUNTER (OUTPATIENT)
Dept: PHYSICAL THERAPY | Facility: OTHER | Age: 44
Setting detail: THERAPIES SERIES
End: 2019-01-30
Attending: FAMILY MEDICINE
Payer: COMMERCIAL

## 2019-01-30 PROCEDURE — 97110 THERAPEUTIC EXERCISES: CPT | Mod: GP

## 2019-02-06 ENCOUNTER — HOSPITAL ENCOUNTER (OUTPATIENT)
Dept: PHYSICAL THERAPY | Facility: OTHER | Age: 44
Setting detail: THERAPIES SERIES
End: 2019-02-06
Attending: FAMILY MEDICINE
Payer: COMMERCIAL

## 2019-02-06 DIAGNOSIS — M75.111 PARTIAL TEAR OF RIGHT ROTATOR CUFF: ICD-10-CM

## 2019-02-06 DIAGNOSIS — M51.369 DDD (DEGENERATIVE DISC DISEASE), LUMBAR: ICD-10-CM

## 2019-02-06 PROCEDURE — 97110 THERAPEUTIC EXERCISES: CPT | Mod: GP

## 2019-02-20 ENCOUNTER — HOSPITAL ENCOUNTER (OUTPATIENT)
Dept: PHYSICAL THERAPY | Facility: OTHER | Age: 44
Setting detail: THERAPIES SERIES
End: 2019-02-20
Attending: FAMILY MEDICINE
Payer: COMMERCIAL

## 2019-02-20 PROCEDURE — 97110 THERAPEUTIC EXERCISES: CPT | Mod: GP

## 2019-03-15 NOTE — ADDENDUM NOTE
Encounter addended by: Sawyer Sheppard on: 3/15/2019 1:42 PM   Actions taken: Sign clinical note, Flowsheet accepted, Episode resolved

## 2019-03-15 NOTE — PROGRESS NOTES
Outpatient Physical Therapy Discharge Note     Patient: Salomón Edwards  : 1975    Beginning/End Dates of Reporting Period:  3/15/2019    Discharge:    Reason for Discharge: Patient chooses to discontinue therapy.    Equipment Issued: None    Discharge Plan: Unplanned DC

## 2019-07-08 ENCOUNTER — OFFICE VISIT (OUTPATIENT)
Dept: FAMILY MEDICINE | Facility: OTHER | Age: 44
End: 2019-07-08
Attending: FAMILY MEDICINE
Payer: COMMERCIAL

## 2019-07-08 VITALS
TEMPERATURE: 97.2 F | HEART RATE: 58 BPM | SYSTOLIC BLOOD PRESSURE: 122 MMHG | HEIGHT: 69 IN | WEIGHT: 171.8 LBS | BODY MASS INDEX: 25.45 KG/M2 | RESPIRATION RATE: 16 BRPM | DIASTOLIC BLOOD PRESSURE: 84 MMHG

## 2019-07-08 DIAGNOSIS — Z00.00 ROUTINE GENERAL MEDICAL EXAMINATION AT A HEALTH CARE FACILITY: Primary | ICD-10-CM

## 2019-07-08 PROCEDURE — 99396 PREV VISIT EST AGE 40-64: CPT | Performed by: FAMILY MEDICINE

## 2019-07-08 ASSESSMENT — MIFFLIN-ST. JEOR: SCORE: 1658.62

## 2019-07-08 ASSESSMENT — PAIN SCALES - GENERAL: PAINLEVEL: NO PAIN (0)

## 2019-07-08 NOTE — PROGRESS NOTES
SUBJECTIVE:   CC: Salomón Edwards is an 44 year old male who presents for preventive health visit.     Healthy Habits:    Do you get at least three servings of calcium containing foods daily (dairy, green leafy vegetables, etc.)? yes    Amount of exercise or daily activities, outside of work: 1 hour(s) per day    Problems taking medications regularly No    Medication side effects: No    Have you had an eye exam in the past two years? no    Do you see a dentist twice per year? yes    Do you have sleep apnea, excessive snoring or daytime drowsiness?yes    Back pain is improved in physical therapy.  He is doing exercises regularly.  Is not very interested did not any intervention or medication.  He is able to do usual activities at this time.    Right shoulder pain has improved with therapy.  Shown to have a high grade partial rotator cuff tear.  Did not see orthopedic surgery. Referral was placed in 2019. We discussed this again today and I recommend a visit to at least set expectations for what to expect if his shoulder worsens.      Today's PHQ-2 Score:   PHQ-2 (  [employer]) 2019 1/15/2019   Q1: Little interest or pleasure in doing things 0 0   Q2: Feeling down, depressed or hopeless 0 0   PHQ-2 Score 0 0       Abuse: Current or Past(Physical, Sexual or Emotional)- No  Do you feel safe in your environment? Yes    Social History     Tobacco Use     Smoking status: Former Smoker     Last attempt to quit: 2015     Years since quittin.5     Smokeless tobacco: Never Used   Substance Use Topics     Alcohol use: No     Alcohol/week: 0.0 oz     If you drink alcohol do you typically have >3 drinks per day or >7 drinks per week? No                      Last PSA: No results found for: PSA    Reviewed orders with patient. Reviewed health maintenance and updated orders accordingly - Yes      Reviewed and updated as needed this visit by clinical staff  Tobacco  Allergies  Meds  Med Hx  Surg Hx  Fam Hx   "Soc Hx        Reviewed and updated as needed this visit by Provider  Tobacco  Allergies  Meds  Problems  Med Hx  Surg Hx  Fam Hx        Past Medical History:   Diagnosis Date     Chronic sprain or strain of lumbar region     Overview:  hx of flares     Closed fracture of talus     6/15/04,Question of     Pneumonia     2/17/05      Past Surgical History:   Procedure Laterality Date     EXTRACTION(S) DENTAL       HERNIA REPAIR, INGUINAL RT/LT Left        ROS:  General: Denies general constitutional problems  Eyes: Denies problems  Ears/Nose/Throat: Denies problems  Cardiovascular: Denies problems  Respiratory: Denies problems  Gastrointestinal: Denies problems  Genitourinary: Denies problems  Musculoskeletal: See above  Skin: Denies problems  Neurologic: Denies problems  Psychiatric: Denies problems      OBJECTIVE:   /84   Pulse 58   Temp 97.2  F (36.2  C) (Tympanic)   Resp 16   Ht 1.759 m (5' 9.25\")   Wt 77.9 kg (171 lb 12.8 oz)   BMI 25.19 kg/m    EXAM:  General Appearance: Pleasant, alert, appropriate appearance for age. No acute distress  Eye Exam:  Normal external eye, conjunctiva, lids, cornea. CIPRIANO.  Ear Exam: Normal TM's bilaterally. Normal auditory canals and external ears.  OroPharynx Exam:  Dental hygiene adequate. Normal buccal mucosa. Normal pharynx.  Neck Exam:  Supple, no masses or nodes.  Thyroid Exam: No nodules or enlargement.  Chest/Respiratory Exam: Normal chest wall and respirations. Clear to auscultation.  Cardiovascular Exam: Regular rate and rhythm. S1, S2, no murmur, click, gallop, or rubs.  Gastrointestinal Exam: Soft, non-tender, no masses or organomegaly.  Musculoskeletal Exam: Back is straight and non-tender, full ROM of upper and lower extremities.  Foot Exam: Left and right foot: good pedal pulses.  Skin: no rash or abnormalities  Neurologic Exam: Nonfocal, symmetric DTRs, normal gross motor, tone coordination and no tremor.  Psychiatric Exam: Alert and oriented - " "appropriate affect.      ASSESSMENT/PLAN:       ICD-10-CM    1. Routine general medical examination at a health care facility Z00.00        COUNSELING:  Reviewed preventive health counseling, as reflected in patient instructions       Regular exercise       Healthy diet/nutrition       Immunizations    Tdap current       The 10-year ASCVD risk score (Esperanza MAJANO Jr., et al., 2013) is: 1.6%    Values used to calculate the score:      Age: 44 years      Sex: Male      Is Non- : No      Diabetic: No      Tobacco smoker: No      Systolic Blood Pressure: 122 mmHg      Is BP treated: No      HDL Cholesterol: 54 mg/dL      Total Cholesterol: 196 mg/dL    Estimated body mass index is 25.19 kg/m  as calculated from the following:    Height as of this encounter: 1.759 m (5' 9.25\").    Weight as of this encounter: 77.9 kg (171 lb 12.8 oz).    Weight management plan: Discussed healthy diet and exercise guidelines     reports that he quit smoking about 4 years ago. He has never used smokeless tobacco.      Counseling Resources:  ATP IV Guidelines  Pooled Cohorts Equation Calculator  FRAX Risk Assessment  ICSI Preventive Guidelines  Dietary Guidelines for Americans, 2010  USDA's MyPlate  ASA Prophylaxis  Lung CA Screening    Humble Arroyo MD  [facility]  "

## 2019-07-08 NOTE — NURSING NOTE
Pt presents to clinic today for annual physical.      Medication Reconciliation: complete  Kerri Ayers LPN

## 2019-12-16 ENCOUNTER — OFFICE VISIT (OUTPATIENT)
Dept: FAMILY MEDICINE | Facility: OTHER | Age: 44
End: 2019-12-16
Attending: FAMILY MEDICINE
Payer: COMMERCIAL

## 2019-12-16 VITALS
TEMPERATURE: 96.5 F | HEART RATE: 90 BPM | OXYGEN SATURATION: 97 % | SYSTOLIC BLOOD PRESSURE: 130 MMHG | RESPIRATION RATE: 12 BRPM | DIASTOLIC BLOOD PRESSURE: 82 MMHG | BODY MASS INDEX: 25.66 KG/M2 | WEIGHT: 175 LBS

## 2019-12-16 DIAGNOSIS — F32.A ANXIETY AND DEPRESSION: Primary | ICD-10-CM

## 2019-12-16 DIAGNOSIS — F41.9 ANXIETY AND DEPRESSION: Primary | ICD-10-CM

## 2019-12-16 PROCEDURE — 99214 OFFICE O/P EST MOD 30 MIN: CPT | Performed by: FAMILY MEDICINE

## 2019-12-16 RX ORDER — BUPROPION HYDROCHLORIDE 150 MG/1
150 TABLET ORAL EVERY MORNING
Qty: 30 TABLET | Refills: 0 | Status: SHIPPED | OUTPATIENT
Start: 2019-12-16 | End: 2020-01-15

## 2019-12-16 ASSESSMENT — ANXIETY QUESTIONNAIRES
5. BEING SO RESTLESS THAT IT IS HARD TO SIT STILL: SEVERAL DAYS
2. NOT BEING ABLE TO STOP OR CONTROL WORRYING: MORE THAN HALF THE DAYS
7. FEELING AFRAID AS IF SOMETHING AWFUL MIGHT HAPPEN: SEVERAL DAYS
GAD7 TOTAL SCORE: 12
1. FEELING NERVOUS, ANXIOUS, OR ON EDGE: MORE THAN HALF THE DAYS
3. WORRYING TOO MUCH ABOUT DIFFERENT THINGS: MORE THAN HALF THE DAYS
IF YOU CHECKED OFF ANY PROBLEMS ON THIS QUESTIONNAIRE, HOW DIFFICULT HAVE THESE PROBLEMS MADE IT FOR YOU TO DO YOUR WORK, TAKE CARE OF THINGS AT HOME, OR GET ALONG WITH OTHER PEOPLE: SOMEWHAT DIFFICULT
6. BECOMING EASILY ANNOYED OR IRRITABLE: MORE THAN HALF THE DAYS

## 2019-12-16 ASSESSMENT — PATIENT HEALTH QUESTIONNAIRE - PHQ9
5. POOR APPETITE OR OVEREATING: MORE THAN HALF THE DAYS
SUM OF ALL RESPONSES TO PHQ QUESTIONS 1-9: 12

## 2019-12-16 ASSESSMENT — PAIN SCALES - GENERAL: PAINLEVEL: NO PAIN (0)

## 2019-12-16 NOTE — NURSING NOTE
Pt presents to clinic today to discuss anxiety and depression.      Medication Reconciliation: complete  Kerri Ayers LPN

## 2019-12-16 NOTE — PROGRESS NOTES
"Nursing Notes:   Kerri Ayers, LPN  12/16/2019  1:06 PM  Sign at exiting of workspace  Pt presents to clinic today to discuss anxiety and depression.      Medication Reconciliation: complete  Kerri Ayers LPN     SUBJECTIVE:  44 year old male presents for anxiety and depression.   He reports feeling a little low or below the attitude he desires for all of his life.  Tried quitting drinking, exercise, and positive attitude to help. They have all helped a little.   Has some difficulty in concentration  Feels that he lacks interest in doing things or that things that are enjoyable should be more so. Feels \"detached\" at times.  Symptoms are worse in the winter. Taking vitamin D 2,000 units daily, which helps.  Snores, but no witnessed apnea  No previous prescription medication treatment.  He has not had a psychological assessment.  He does report times in his life when he has more energetic, but nothing that sounds like isaak or hypomania.    REVIEW OF SYSTEMS:    Pertinent items are noted in HPI.  Musculoskeletal: Chronic back pain has been much better recently, controlled with regular exercise    No current outpatient medications on file.     No Known Allergies    OBJECTIVE:  /82   Pulse 90   Temp 96.5  F (35.8  C) (Tympanic)   Resp 12   Wt 79.4 kg (175 lb)   SpO2 97%   BMI 25.66 kg/m      EXAM:  General Appearance: Alert. No acute distress  Psychiatric: Normal affect and mentation      ASSESSMENT/PLAN:    ICD-10-CM    1. Anxiety and depression F41.9 buPROPion (WELLBUTRIN XL) 150 MG 24 hr tablet    F32.9        Discussed options for treatment including SSRIs, SNRIs, and bupropion.  If he is having more chronic back pain, then duloxetine or venlafaxine could be a good choice.  For concentration, bupropion may be ideal.  He was already inclined to choose this.  We discussed dose and how strong he wanted to start.  We discussed risk for the alerting properties of bupropion including anxiety or tendency " to trigger isaak in certain individuals.  After some conversation, elected to start 150 mg 24-hour tablet.  If this is either too strong, or contributes to insomnia, then he could consider a lower dose or a shorter acting version taken in the morning to see if this helps.  Follow-up follow up in a month    Greater than 50% of this 30 minute appointment spent on counseling   Humble Arroyo MD    This document was prepared using a combination of typing and voice generated software.  While every attempt was made for accuracy, spelling and grammatical errors may exist.

## 2019-12-17 ASSESSMENT — ANXIETY QUESTIONNAIRES: GAD7 TOTAL SCORE: 12

## 2020-01-14 DIAGNOSIS — F41.9 ANXIETY AND DEPRESSION: ICD-10-CM

## 2020-01-14 DIAGNOSIS — F32.A ANXIETY AND DEPRESSION: ICD-10-CM

## 2020-01-15 RX ORDER — BUPROPION HYDROCHLORIDE 150 MG/1
150 TABLET ORAL EVERY MORNING
Qty: 30 TABLET | Refills: 0 | Status: SHIPPED | OUTPATIENT
Start: 2020-01-15 | End: 2020-02-19

## 2020-01-15 NOTE — TELEPHONE ENCOUNTER
[facility] sent Rx request for the following:      Bupropion 150 mg 24 hr tablet      Last Prescription Date:   12/16/19  Last Fill Qty/Refills:         30, R-0    Last Office Visit:              12/16/19   Future Office visit:           None noted    Routing refill request to provider for review/approval because:    Requested Prescriptions   Pending Prescriptions Disp Refills     buPROPion (WELLBUTRIN XL) 150 MG 24 hr tablet [Pharmacy Med Name: BUPROPION  MG TAB ER 24H] 30 tablet 0     Sig: Take 1 tablet (150 mg) by mouth every morning       SSRIs Protocol Failed - 1/14/2020  8:21 AM        Failed - PHQ-9 score less than 5 in past 6 months     Please review last PHQ-9 score.          Unable to complete prescription refill per RNMedication Refill Policy.................... Keke Boogie RN ....................  1/15/2020   1:58 PM

## 2020-02-19 ENCOUNTER — OFFICE VISIT (OUTPATIENT)
Dept: FAMILY MEDICINE | Facility: OTHER | Age: 45
End: 2020-02-19
Attending: FAMILY MEDICINE
Payer: COMMERCIAL

## 2020-02-19 VITALS
SYSTOLIC BLOOD PRESSURE: 123 MMHG | BODY MASS INDEX: 24.72 KG/M2 | TEMPERATURE: 96.3 F | HEART RATE: 66 BPM | WEIGHT: 168.6 LBS | RESPIRATION RATE: 13 BRPM | DIASTOLIC BLOOD PRESSURE: 85 MMHG

## 2020-02-19 DIAGNOSIS — F41.9 ANXIETY AND DEPRESSION: ICD-10-CM

## 2020-02-19 DIAGNOSIS — F32.A ANXIETY AND DEPRESSION: ICD-10-CM

## 2020-02-19 PROCEDURE — 99213 OFFICE O/P EST LOW 20 MIN: CPT | Performed by: FAMILY MEDICINE

## 2020-02-19 RX ORDER — BUPROPION HYDROCHLORIDE 150 MG/1
150 TABLET ORAL EVERY MORNING
Qty: 90 TABLET | Refills: 3 | Status: SHIPPED | OUTPATIENT
Start: 2020-02-19 | End: 2021-02-24

## 2020-02-19 RX ORDER — BUPROPION HYDROCHLORIDE 75 MG/1
75 TABLET ORAL DAILY PRN
Qty: 90 TABLET | Refills: 0 | Status: SHIPPED | OUTPATIENT
Start: 2020-02-19 | End: 2021-05-11 | Stop reason: DRUGHIGH

## 2020-02-19 ASSESSMENT — ANXIETY QUESTIONNAIRES
7. FEELING AFRAID AS IF SOMETHING AWFUL MIGHT HAPPEN: NOT AT ALL
GAD7 TOTAL SCORE: 3
3. WORRYING TOO MUCH ABOUT DIFFERENT THINGS: NOT AT ALL
IF YOU CHECKED OFF ANY PROBLEMS ON THIS QUESTIONNAIRE, HOW DIFFICULT HAVE THESE PROBLEMS MADE IT FOR YOU TO DO YOUR WORK, TAKE CARE OF THINGS AT HOME, OR GET ALONG WITH OTHER PEOPLE: SOMEWHAT DIFFICULT
1. FEELING NERVOUS, ANXIOUS, OR ON EDGE: SEVERAL DAYS
5. BEING SO RESTLESS THAT IT IS HARD TO SIT STILL: NOT AT ALL
6. BECOMING EASILY ANNOYED OR IRRITABLE: SEVERAL DAYS
2. NOT BEING ABLE TO STOP OR CONTROL WORRYING: NOT AT ALL

## 2020-02-19 ASSESSMENT — PAIN SCALES - GENERAL: PAINLEVEL: NO PAIN (0)

## 2020-02-19 ASSESSMENT — PATIENT HEALTH QUESTIONNAIRE - PHQ9
SUM OF ALL RESPONSES TO PHQ QUESTIONS 1-9: 8
5. POOR APPETITE OR OVEREATING: SEVERAL DAYS

## 2020-02-19 NOTE — NURSING NOTE
Pt presents to clinic today for medication management.      Medication Reconciliation: complete  Kerri Ayers LPN

## 2020-02-19 NOTE — PROGRESS NOTES
Nursing Notes:   Kerri Ayers, LPN  2/19/2020 10:42 AM  Signed  Pt presents to clinic today for medication management.      Medication Reconciliation: complete  Kerri Ayers LPN     SUBJECTIVE:  45 year old male presents for anxiety and depression.  He reports feeling a little low or below the attitude he desires for all of his life.  Started on bupropion 150 mg daily. It worked well.  Had some blurred vision, but tolerable.  Currently out of bupropion as he did not follow-up in time.  He is hoping to have a little bit more bupropion to help with worse symptoms in the winter.  He thinks he might take it for a couple months then stopped during the summertime.      REVIEW OF SYSTEMS:    Pertinent items are noted in HPI.    Current Outpatient Medications   Medication Sig Dispense Refill     buPROPion (WELLBUTRIN XL) 150 MG 24 hr tablet Take 1 tablet (150 mg) by mouth every morning 30 tablet 0     No Known Allergies    OBJECTIVE:  /85   Pulse 66   Temp 96.3  F (35.7  C) (Tympanic)   Resp 13   Wt 76.5 kg (168 lb 9.6 oz)   BMI 24.72 kg/m      EXAM:  General Appearance: Alert. No acute distress  Psychiatric: Normal affect and mentation      ASSESSMENT/PLAN:    ICD-10-CM    1. Anxiety and depression F41.9 buPROPion 150 MG PO 24 hr tablet    F32.9        Bupropion 150 mg once daily has worked well.  This is refilled.  He will stay on this chronically.  We spent some time discussing an increase in dose.  He is hoping for a slight boost from a low-dose of bupropion.  Settled on bupropion 75 mg daily to take as needed during the winter.  Cautioned about additional side effects.  I feel he may be best served by just staying at the 150 mg.  Certainly if he has side effects with the additional bupropion, stop taking the booster dose and stay at 150 mg.    Humble Arroyo MD    This document was prepared using a combination of typing and voice generated software.  While every attempt was made for accuracy, spelling  and grammatical errors may exist.

## 2020-02-20 ASSESSMENT — ANXIETY QUESTIONNAIRES: GAD7 TOTAL SCORE: 3

## 2020-05-20 ENCOUNTER — VIRTUAL VISIT (OUTPATIENT)
Dept: FAMILY MEDICINE | Facility: OTHER | Age: 45
End: 2020-05-20
Attending: FAMILY MEDICINE
Payer: COMMERCIAL

## 2020-05-20 VITALS — BODY MASS INDEX: 23.31 KG/M2 | WEIGHT: 159 LBS

## 2020-05-20 DIAGNOSIS — M76.51 PATELLAR TENDONITIS OF RIGHT KNEE: Primary | ICD-10-CM

## 2020-05-20 DIAGNOSIS — M70.50 PES ANSERINE BURSITIS: ICD-10-CM

## 2020-05-20 PROCEDURE — 99213 OFFICE O/P EST LOW 20 MIN: CPT | Mod: GT | Performed by: FAMILY MEDICINE

## 2020-05-20 ASSESSMENT — PAIN SCALES - GENERAL: PAINLEVEL: MILD PAIN (2)

## 2020-05-20 NOTE — PROGRESS NOTES
"Salomón Edwards is a 45 year old male who is being evaluated via a billable video visit.      The patient has been notified of following:     \"This video visit will be conducted via a call between you and your physician/provider. We have found that certain health care needs can be provided without the need for an in-person physical exam.  This service lets us provide the care you need with a video conversation.  If a prescription is necessary we can send it directly to your pharmacy.  If lab work is needed we can place an order for that and you can then stop by our lab to have the test done at a later time.    Video visits are billed at different rates depending on your insurance coverage.  Please reach out to your insurance provider with any questions.    If during the course of the call the physician/provider feels a video visit is not appropriate, you will not be charged for this service.\"    Patient has given verbal consent for Video visit? Yes    How would you like to obtain your AVS? Renu    Patient would like the video invitation sent by: Send to e-mail at: [email or web address]    Will anyone else be joining your video visit? No    Subjective     Salomón Edwards is a 45 year old male who presents today via video visit for the following health issues:    HPI    Started running more and developed knee pain. Cut back on running. Iced frequently.  Pain on medial knee near pes anserine bursa  Also anteriorly over patellar tendon  He has not tried short distance running, generally putting on several miles, but then hurts and has to rest a couple days  Taking ibuprofen sporadically    Also having some golfer's elbow since lifting weights at home. Can try a forearm strap    Video Start Time: 11:54 AM        Patient Active Problem List   Diagnosis     Chronic sprain or strain of lumbar region     Chronic right-sided low back pain without sciatica     Partial tear of right rotator cuff     DDD (degenerative " "disc disease), lumbar     Past Surgical History:   Procedure Laterality Date     EXTRACTION(S) DENTAL       HERNIA REPAIR, INGUINAL RT/LT Left        Social History     Tobacco Use     Smoking status: Former Smoker     Last attempt to quit: 2015     Years since quittin.3     Smokeless tobacco: Never Used   Substance Use Topics     Alcohol use: Yes     Alcohol/week: 0.0 standard drinks     Comment: 7 a week      Family History   Problem Relation Age of Onset     Heart Disease Father 65        Heart Disease,CAD with stents     Substance Abuse Father         Alcohol/Drug,Smoker     Other - See Comments Father         Overweight     Liver Disease Father         Liver disease     Lymphoma Father         Hodkins Lymphoma     Heart Disease Maternal Grandfather 55        Heart Disease,MI     Substance Abuse Maternal Grandfather         Alcohol/Drug,Smoker     Other - See Comments Mother         Fibromyalgia/Scoliosis/Low back DJD     No Known Problems Sister      No Known Problems Brother      Diabetes No family hx of         Diabetes     Colon Cancer No family hx of         Cancer-colon     Prostate Cancer No family hx of          Current Outpatient Medications   Medication Sig Dispense Refill     buPROPion 150 MG PO 24 hr tablet Take 1 tablet (150 mg) by mouth every morning 90 tablet 3     buPROPion 75 MG PO tablet Take 1 tablet (75 mg) by mouth daily as needed (concentration) 90 tablet 0     No Known Allergies    Reviewed and updated as needed this visit by Provider  Tobacco  Allergies  Meds  Problems  Med Hx  Surg Hx  Fam Hx         Review of Systems   No numbness or tingling      Objective    Wt 72.1 kg (159 lb)   BMI 23.31 kg/m    Estimated body mass index is 23.31 kg/m  as calculated from the following:    Height as of 19: 1.759 m (5' 9.25\").    Weight as of this encounter: 72.1 kg (159 lb).  Physical Exam     GENERAL: Healthy, alert and no distress  EYES: Eyes grossly normal to inspection.  No " "discharge or erythema, or obvious scleral/conjunctival abnormalities.  RESP: No audible wheeze, cough, or visible cyanosis.  No visible retractions or increased work of breathing.    MS: He kateryna a [place] over painful area at pes anserine bursa and a line over patellar tendon. We walked through palpation of the joint line, which is nontender. Good ROM. No knee swelling.  SKIN: Visible skin clear. No significant rash, abnormal pigmentation or lesions.  NEURO: Cranial nerves grossly intact.  Mentation and speech appropriate for age.  PSYCH: Mentation appears normal, affect normal/bright, judgement and insight intact, normal speech and appearance well-groomed.            Assessment & Plan       ICD-10-CM    1. Patellar tendonitis of right knee  M76.51    2. Pes anserine bursitis  M70.50      Recommend a course of NSAIDS to reduce pain. Ibuprofen 600 mg TID for a week  Can look up exercises for above conditions  Ice after running  [place] distance running each day and gradually build back to longer distances         BMI:   Estimated body mass index is 23.31 kg/m  as calculated from the following:    Height as of 7/8/19: 1.759 m (5' 9.25\").    Weight as of this encounter: 72.1 kg (159 lb).           Humble Arroyo MD  [facility]      Video-Visit Details    Type of service:  Video Visit    Video End Time:12:15 PM    Originating Location (pt. Location): Home    Distant Location (provider location):  [facility]     Platform used for Video Visit: [place]    No follow-ups on file.       Humble Arroyo MD      "

## 2020-05-20 NOTE — PATIENT INSTRUCTIONS
Try exercises for patellar tendonitis (jumper's knee) and pes anserine bursitis    Take ibuprofen 600 mg three times daily regularly for a week to reduce inflammation    Ice after running  [place] distance running each day and gradually build back to longer distances    There is a strap for golfer's elbow that might help

## 2020-05-20 NOTE — NURSING NOTE
Patient scheduled for a video visit for the right knee pain for the past 2 weeks. Unsure of what caused the pain.Medication Reconciliation: complete.    Griselda Bazzi LPN  5/20/2020 11:32 AM

## 2020-10-20 ENCOUNTER — ALLIED HEALTH/NURSE VISIT (OUTPATIENT)
Dept: FAMILY MEDICINE | Facility: OTHER | Age: 45
End: 2020-10-20
Attending: FAMILY MEDICINE
Payer: COMMERCIAL

## 2020-10-20 DIAGNOSIS — J02.9 SORE THROAT: Primary | ICD-10-CM

## 2020-10-20 DIAGNOSIS — R06.02 SHORTNESS OF BREATH: ICD-10-CM

## 2020-10-20 DIAGNOSIS — R53.83 FATIGUE: ICD-10-CM

## 2020-10-20 PROCEDURE — 99207 PR NO CHARGE NURSE ONLY: CPT

## 2020-10-20 PROCEDURE — U0003 INFECTIOUS AGENT DETECTION BY NUCLEIC ACID (DNA OR RNA); SEVERE ACUTE RESPIRATORY SYNDROME CORONAVIRUS 2 (SARS-COV-2) (CORONAVIRUS DISEASE [COVID-19]), AMPLIFIED PROBE TECHNIQUE, MAKING USE OF HIGH THROUGHPUT TECHNOLOGIES AS DESCRIBED BY CMS-2020-01-R: HCPCS | Mod: ZL | Performed by: FAMILY MEDICINE

## 2020-10-20 PROCEDURE — C9803 HOPD COVID-19 SPEC COLLECT: HCPCS

## 2020-10-21 LAB
SARS-COV-2 RNA SPEC QL NAA+PROBE: ABNORMAL
SPECIMEN SOURCE: ABNORMAL

## 2020-12-16 ENCOUNTER — OFFICE VISIT (OUTPATIENT)
Dept: FAMILY MEDICINE | Facility: OTHER | Age: 45
End: 2020-12-16
Attending: PHYSICIAN ASSISTANT
Payer: COMMERCIAL

## 2020-12-16 ENCOUNTER — HOSPITAL ENCOUNTER (OUTPATIENT)
Dept: GENERAL RADIOLOGY | Facility: OTHER | Age: 45
End: 2020-12-16
Attending: PHYSICIAN ASSISTANT
Payer: COMMERCIAL

## 2020-12-16 VITALS
HEART RATE: 67 BPM | RESPIRATION RATE: 16 BRPM | TEMPERATURE: 97.4 F | OXYGEN SATURATION: 97 % | DIASTOLIC BLOOD PRESSURE: 80 MMHG | WEIGHT: 166.8 LBS | BODY MASS INDEX: 24.45 KG/M2 | SYSTOLIC BLOOD PRESSURE: 136 MMHG

## 2020-12-16 DIAGNOSIS — S40.012A CONTUSION OF LEFT SHOULDER, INITIAL ENCOUNTER: Primary | ICD-10-CM

## 2020-12-16 DIAGNOSIS — M25.512 ACUTE PAIN OF LEFT SHOULDER: ICD-10-CM

## 2020-12-16 PROCEDURE — 99213 OFFICE O/P EST LOW 20 MIN: CPT | Performed by: PHYSICIAN ASSISTANT

## 2020-12-16 PROCEDURE — 73030 X-RAY EXAM OF SHOULDER: CPT | Mod: LT

## 2020-12-16 ASSESSMENT — PAIN SCALES - GENERAL: PAINLEVEL: EXTREME PAIN (8)

## 2020-12-16 NOTE — PATIENT INSTRUCTIONS
Encouraged to take ibuprofen (400-800mg) for relief up to 4 times per day.  Encouraged rest and elevation.  Encouraged to use ice or heat 15 minutes at a time several times per day to decrease pain. Return to clinic in 1-2 weeks as necessary for persistent pain. Return to clinic with any change or worsening of symptoms.       Patient Education     Exercises for Shoulder Flexibility: Wall Walk    Improving your flexibility can reduce pain. Stretching exercises also can help increase your range of pain-free motion. Breathe normally when you exercise. Use smooth, fluid movements.  Note: Follow any special instructions you are given. If you feel pain, stop the exercise. If the pain continues after stopping, call your healthcare provider:    Stand with your shoulder about 2 feet from the wall.    Raise your arm to shoulder level and gently  walk  your fingers up the wall as high as you can.    Hold for a few seconds. Then walk your fingers back down.    Repeat 3 times. Move closer to the wall as you repeat.    Build up to holding each stretch for 30 seconds.  Caution: Do this stretch only if your healthcare provider recommends it. Don t do it when you are first injured.  [employer] last reviewed this educational content on 3/1/2018    0959-3831 [employer]. [address] [place] [address]. All rights reserved. This information is not intended as a substitute for professional medical care. Always follow your healthcare professional's instructions.           Patient Education     Pendulum (Flexibility)    1. Lean over next to a table, with your left arm supporting your weight on the table.  2. Relax your right arm and let it hang straight down.  3. Slowly begin to swing your right arm in a small [place]. Gradually make the [place] bigger if you can. Change direction after 1 minute of motion.  4. Next, swing your right arm backward and forward. Then move it side to side. Change direction after 1 minute  of motion.  5. Repeat these movements for about 5 minutes.  6. Do this exercise 3 times a day, or as often as instructed.  [employer] last reviewed this educational content on 3/10/2016    2126-7843 [employer]. [address] [place] [address]. All rights reserved. This information is not intended as a substitute for professional medical care. Always follow your healthcare professional's instructions.           Patient Education     Shoulder Flexion (Flexibility)     1. Sit in a chair sideways next to a table. Lay your right arm on the table, pointed forward.  2. Slowly lean forward. Slide your arm forward on the table. Feel the stretch in your right shoulder.  3. Hold for 5 seconds. Slowly sit back up.  4. Repeat 5 times.  5. Switch sides and repeat, if instructed.  6. Repeat this exercise 3 times a day, or as instructed.  [employer] last reviewed this educational content on 8/1/2019 2000-2020 [employer]. [address] [place] [address]. All rights reserved. This information is not intended as a substitute for professional medical care. Always follow your healthcare professional's instructions.

## 2020-12-16 NOTE — NURSING NOTE
Chief Complaint   Patient presents with     Pain     L shoulder      Slipped on the ice last night. Pain is 8/10 without ibuprofen. Good relief with Ibuprofen. Limited ROM.     Medication Reconciliation: complete    Jennifer Cabrales, JUVENTINON]

## 2020-12-16 NOTE — PROGRESS NOTES
Nursing Notes:   Jennifer Cabrales LPN  2020  4:25 PM  Signed  Chief Complaint   Patient presents with     Pain     L shoulder      Slipped on the ice last night. Pain is 8/10 without ibuprofen. Good relief with Ibuprofen. Limited ROM.     Medication Reconciliation: complete    Jennifer AVALOS. AKANKSHA Cabrales]      HPI:    Salomón Edwards is a 45 year old male who presents for left shoulder pain.  He has noticed pain with reaching up.  He can get to 70 degrees of abduction prior to having pain.  Recently landed on his left lateral shoulder which started the pain.  Also having pain with touching his head.  He has been using ibuprofen as needed for symptomatic relief.  No swelling or bruising appreciated.      Past Medical History:   Diagnosis Date     Chronic sprain or strain of lumbar region     Overview:  hx of flares     Closed fracture of talus     6/15/04,Question of     Pneumonia     05       Past Surgical History:   Procedure Laterality Date     EXTRACTION(S) DENTAL       HERNIA REPAIR, INGUINAL RT/LT Left        Family History   Problem Relation Age of Onset     Heart Disease Father 65        Heart Disease,CAD with stents     Substance Abuse Father         Alcohol/Drug,Smoker     Other - See Comments Father         Overweight     Liver Disease Father         Liver disease     Lymphoma Father         Hodkins Lymphoma     Heart Disease Maternal Grandfather 55        Heart Disease,MI     Substance Abuse Maternal Grandfather         Alcohol/Drug,Smoker     Other - See Comments Mother         Fibromyalgia/Scoliosis/Low back DJD     No Known Problems Sister      No Known Problems Brother      Diabetes No family hx of         Diabetes     Colon Cancer No family hx of         Cancer-colon     Prostate Cancer No family hx of        Social History     Tobacco Use     Smoking status: Former Smoker     Quit date: 2015     Years since quittin.9     Smokeless tobacco: Never Used   Substance Use Topics     Alcohol  use: Yes     Alcohol/week: 0.0 standard drinks     Comment: 7 a week        Current Outpatient Medications   Medication Sig Dispense Refill     buPROPion 150 MG PO 24 hr tablet Take 1 tablet (150 mg) by mouth every morning 90 tablet 3     buPROPion 75 MG PO tablet Take 1 tablet (75 mg) by mouth daily as needed (concentration) 90 tablet 0       No Known Allergies    REVIEW OF SYSTEMS:  Refer to HPI.    EXAM:   Vitals:    /80 (BP Location: Left arm, Patient Position: Sitting, Cuff Size: Adult Regular)   Pulse 67   Temp 97.4  F (36.3  C) (Tympanic)   Resp 16   Wt 75.7 kg (166 lb 12.8 oz)   SpO2 97%   BMI 24.45 kg/m      General Appearance: Pleasant, alert, appropriate appearance for age. No acute distress  Musculoskeletal Exam: Left shoulder pain with abduction at 70 degrees along with internal rotation.  Otherwise unremarkable range of motion.  No bruising or swelling appreciated.  No bicipital groove pain with palpation.  Mild left lateral clavicular pain with palpation.  No spinal or paraspinous muscle pain to palpation.  Skin: no rash or abnormalities  Neurologic Exam: Nonfocal, symmetric DTRs, normal gross motor, tone coordination and no tremor.  Psychiatric Exam: Alert and oriented -appropriate affect.    PHQ Depression Screen  PHQ-9 SCORE 9/16/2014 12/16/2019 2/19/2020   PHQ-9 Total Score 3 12 8       ASSESSMENT AND PLAN:      ICD-10-CM    1. Contusion of left shoulder, initial encounter  S40.012A    2. Acute pain of left shoulder  M25.512 XR Shoulder Left G/E 3 Views         Completed left shoulder xray.  I personally reviewed the xray. I found no fracture appreciated upon initial read of xray.  Final read pending by radiology.     Left shoulder contusion:  Encouraged to take ibuprofen (400-800mg) for relief up to 4 times per day.  Encouraged rest and elevation.  Encouraged to use ice or heat 15 minutes at a time several times per day to decrease pain. Return to clinic in 1-2 weeks as necessary for  persistent pain. Return to clinic with any change or worsening of symptoms.       Patient Instructions   Encouraged to take ibuprofen (400-800mg) for relief up to 4 times per day.  Encouraged rest and elevation.  Encouraged to use ice or heat 15 minutes at a time several times per day to decrease pain. Return to clinic in 1-2 weeks as necessary for persistent pain. Return to clinic with any change or worsening of symptoms.       Patient Education     Exercises for Shoulder Flexibility: Wall Walk    Improving your flexibility can reduce pain. Stretching exercises also can help increase your range of pain-free motion. Breathe normally when you exercise. Use smooth, fluid movements.  Note: Follow any special instructions you are given. If you feel pain, stop the exercise. If the pain continues after stopping, call your healthcare provider:    Stand with your shoulder about 2 feet from the wall.    Raise your arm to shoulder level and gently  walk  your fingers up the wall as high as you can.    Hold for a few seconds. Then walk your fingers back down.    Repeat 3 times. Move closer to the wall as you repeat.    Build up to holding each stretch for 30 seconds.  Caution: Do this stretch only if your healthcare provider recommends it. Don t do it when you are first injured.  [employer] last reviewed this educational content on 3/1/2018    [id number] [employer]. [address] [place] [address], [place] [address]. All rights reserved. This information is not intended as a substitute for professional medical care. Always follow your healthcare professional's instructions.           Patient Education     Pendulum (Flexibility)    1. Lean over next to a table, with your left arm supporting your weight on the table.  2. Relax your right arm and let it hang straight down.  3. Slowly begin to swing your right arm in a small [place]. Gradually make the [place] bigger if you can. Change direction after 1 minute of  motion.  4. Next, swing your right arm backward and forward. Then move it side to side. Change direction after 1 minute of motion.  5. Repeat these movements for about 5 minutes.  6. Do this exercise 3 times a day, or as often as instructed.  [employer] last reviewed this educational content on 3/10/2016    3335-7857 [employer]. [address] [place] [address]. All rights reserved. This information is not intended as a substitute for professional medical care. Always follow your healthcare professional's instructions.           Patient Education     Shoulder Flexion (Flexibility)     1. Sit in a chair sideways next to a table. Lay your right arm on the table, pointed forward.  2. Slowly lean forward. Slide your arm forward on the table. Feel the stretch in your right shoulder.  3. Hold for 5 seconds. Slowly sit back up.  4. Repeat 5 times.  5. Switch sides and repeat, if instructed.  6. Repeat this exercise 3 times a day, or as instructed.  [employer]arrett last reviewed this educational content on 8/1/2019 2000-2020 [employer]. [address] [place] [address]. All rights reserved. This information is not intended as a substitute for professional medical care. Always follow your healthcare professional's instructions.                Bernadette Ceballos PA-C PA-C..................12/16/2020 4:25 PM

## 2020-12-27 ENCOUNTER — HEALTH MAINTENANCE LETTER (OUTPATIENT)
Age: 45
End: 2020-12-27

## 2021-02-24 ENCOUNTER — OFFICE VISIT (OUTPATIENT)
Dept: FAMILY MEDICINE | Facility: OTHER | Age: 46
End: 2021-02-24
Attending: FAMILY MEDICINE
Payer: COMMERCIAL

## 2021-02-24 VITALS
HEIGHT: 69 IN | BODY MASS INDEX: 23.7 KG/M2 | DIASTOLIC BLOOD PRESSURE: 76 MMHG | HEART RATE: 72 BPM | WEIGHT: 160 LBS | TEMPERATURE: 98 F | OXYGEN SATURATION: 98 % | SYSTOLIC BLOOD PRESSURE: 120 MMHG | RESPIRATION RATE: 20 BRPM

## 2021-02-24 DIAGNOSIS — F32.A ANXIETY AND DEPRESSION: ICD-10-CM

## 2021-02-24 DIAGNOSIS — F41.9 ANXIETY AND DEPRESSION: ICD-10-CM

## 2021-02-24 PROCEDURE — 99213 OFFICE O/P EST LOW 20 MIN: CPT | Performed by: FAMILY MEDICINE

## 2021-02-24 RX ORDER — BUPROPION HYDROCHLORIDE 150 MG/1
150 TABLET ORAL EVERY MORNING
Qty: 90 TABLET | Refills: 4 | Status: SHIPPED | OUTPATIENT
Start: 2021-02-24 | End: 2021-12-21 | Stop reason: DRUGHIGH

## 2021-02-24 RX ORDER — BUPROPION HYDROCHLORIDE 75 MG/1
75 TABLET ORAL DAILY PRN
Status: CANCELLED | OUTPATIENT
Start: 2021-02-24

## 2021-02-24 ASSESSMENT — ANXIETY QUESTIONNAIRES
1. FEELING NERVOUS, ANXIOUS, OR ON EDGE: NOT AT ALL
3. WORRYING TOO MUCH ABOUT DIFFERENT THINGS: NOT AT ALL
IF YOU CHECKED OFF ANY PROBLEMS ON THIS QUESTIONNAIRE, HOW DIFFICULT HAVE THESE PROBLEMS MADE IT FOR YOU TO DO YOUR WORK, TAKE CARE OF THINGS AT HOME, OR GET ALONG WITH OTHER PEOPLE: NOT DIFFICULT AT ALL
GAD7 TOTAL SCORE: 3
7. FEELING AFRAID AS IF SOMETHING AWFUL MIGHT HAPPEN: NOT AT ALL
5. BEING SO RESTLESS THAT IT IS HARD TO SIT STILL: NOT AT ALL
6. BECOMING EASILY ANNOYED OR IRRITABLE: SEVERAL DAYS
2. NOT BEING ABLE TO STOP OR CONTROL WORRYING: NOT AT ALL

## 2021-02-24 ASSESSMENT — PAIN SCALES - GENERAL: PAINLEVEL: SEVERE PAIN (7)

## 2021-02-24 ASSESSMENT — MIFFLIN-ST. JEOR: SCORE: 1587.17

## 2021-02-24 ASSESSMENT — PATIENT HEALTH QUESTIONNAIRE - PHQ9
SUM OF ALL RESPONSES TO PHQ QUESTIONS 1-9: 12
5. POOR APPETITE OR OVEREATING: MORE THAN HALF THE DAYS

## 2021-02-24 NOTE — PROGRESS NOTES
"Nursing Notes:   Carol Tilley LPN  2/24/2021  1:50 PM  Sign at exiting of workspace  Patient presents to the clinic for medication management..      Chief Complaint   Patient presents with     Recheck Medication       Initial /76 (BP Location: Right arm, Patient Position: Sitting, Cuff Size: Adult Regular)   Pulse 72   Temp 98  F (36.7  C) (Temporal)   Resp 20   Ht 1.746 m (5' 8.75\")   Wt 72.6 kg (160 lb)   SpO2 98%   BMI 23.80 kg/m   Estimated body mass index is 23.8 kg/m  as calculated from the following:    Height as of this encounter: 1.746 m (5' 8.75\").    Weight as of this encounter: 72.6 kg (160 lb).  Medication Reconciliation: complete    Carol Tilley LPN         Assessment & Plan       ICD-10-CM    1. Anxiety and depression  F41.9 buPROPion (WELLBUTRIN XL) 150 MG 24 hr tablet    F32.9        Scoring today is elevated. \"I am just being honest.\"  No intent to act on thoughts.  He works in the mental health field.  Believes symptoms will improve on bupropion taking consistently.  We will do a MyChart follow-up to ensure score improvement.      Depression Screening Follow Up    PHQ 2/24/2021   PHQ-9 Total Score 12   Q9: Thoughts of better off dead/self-harm past 2 weeks Several days         Discussed the following ways the patient can remain in a safe environment:  be around others        Humble Arroyo MD     [facility]      SUBJECTIVE:  46 year old male presents for follow up on anxiety and depression.    Took additional 75 mg dose for a short time with 150 mg and tolerated, but generally felt the extra dose unnecessary.  Typically has been taking 150 mg daily  Seems to help most with anxiety  Feels more patient with family. Helps with \"grouchiness\"  Has been running low on bupropion prior to this appointment and has not been taking it consistently over the past couple weeks.  PHQ score is elevated.  Believes it would be better on the medication.          REVIEW OF " "SYSTEMS:    Pertinent items are noted in HPI.    Current Outpatient Medications   Medication Sig Dispense Refill     buPROPion 150 MG PO 24 hr tablet Take 1 tablet (150 mg) by mouth every morning 90 tablet 3     buPROPion 75 MG PO tablet Take 1 tablet (75 mg) by mouth daily as needed (concentration) 90 tablet 0     No Known Allergies    OBJECTIVE:  /76 (BP Location: Right arm, Patient Position: Sitting, Cuff Size: Adult Regular)   Pulse 72   Temp 98  F (36.7  C) (Temporal)   Resp 20   Ht 1.746 m (5' 8.75\")   Wt 72.6 kg (160 lb)   SpO2 98%   BMI 23.80 kg/m      EXAM:  General Appearance: Alert. No acute distress  Psychiatric: Normal affect and mentation          This document was prepared using a combination of typing and voice generated software.  While every attempt was made for accuracy, spelling and grammatical errors may exist.   "

## 2021-02-24 NOTE — NURSING NOTE
"Patient presents to the clinic for medication management..      Chief Complaint   Patient presents with     Recheck Medication       Initial /76 (BP Location: Right arm, Patient Position: Sitting, Cuff Size: Adult Regular)   Pulse 72   Temp 98  F (36.7  C) (Temporal)   Resp 20   Ht 1.746 m (5' 8.75\")   Wt 72.6 kg (160 lb)   SpO2 98%   BMI 23.80 kg/m   Estimated body mass index is 23.8 kg/m  as calculated from the following:    Height as of this encounter: 1.746 m (5' 8.75\").    Weight as of this encounter: 72.6 kg (160 lb).  Medication Reconciliation: complete    Carol Tilley LPN    "

## 2021-02-25 ASSESSMENT — ANXIETY QUESTIONNAIRES: GAD7 TOTAL SCORE: 3

## 2021-03-11 ENCOUNTER — TELEPHONE (OUTPATIENT)
Dept: FAMILY MEDICINE | Facility: OTHER | Age: 46
End: 2021-03-11

## 2021-03-11 NOTE — TELEPHONE ENCOUNTER
Patient Quality Outreach      Summary:    Patient has the following on his problem list/HM:     Depression / Dysthymia review    6 Month Remission: 4-8 month window range:   12 Month Remission: 10-14 month window range:        PHQ-9 SCORE 2/19/2020 2/24/2021 3/10/2021   PHQ-9 Total Score MyChart - - 4 (Minimal depression)   PHQ-9 Total Score 8 12 4       If PHQ-9 recheck is 5 or more, route to provider for next steps.    Patient is due/failing the following:   PHQ completed on 3-, during an office visit with PCP.    Type of outreach:    PHQ completed on 3-, during an office visit with PCP.    Questions for provider review:    None                                                                                                                                     Carol Tilley LPN 3/11/2021 12:58 PM

## 2021-04-23 ENCOUNTER — TELEPHONE (OUTPATIENT)
Dept: FAMILY MEDICINE | Facility: OTHER | Age: 46
End: 2021-04-23

## 2021-04-23 DIAGNOSIS — M51.369 DEGENERATION OF LUMBAR INTERVERTEBRAL DISC: ICD-10-CM

## 2021-04-23 DIAGNOSIS — M54.50 CHRONIC RIGHT-SIDED LOW BACK PAIN WITHOUT SCIATICA: Primary | ICD-10-CM

## 2021-04-23 DIAGNOSIS — G89.29 CHRONIC RIGHT-SIDED LOW BACK PAIN WITHOUT SCIATICA: Primary | ICD-10-CM

## 2021-04-23 ASSESSMENT — ANXIETY QUESTIONNAIRES
IF YOU CHECKED OFF ANY PROBLEMS ON THIS QUESTIONNAIRE, HOW DIFFICULT HAVE THESE PROBLEMS MADE IT FOR YOU TO DO YOUR WORK, TAKE CARE OF THINGS AT HOME, OR GET ALONG WITH OTHER PEOPLE: NOT DIFFICULT AT ALL
7. FEELING AFRAID AS IF SOMETHING AWFUL MIGHT HAPPEN: NOT AT ALL
1. FEELING NERVOUS, ANXIOUS, OR ON EDGE: NOT AT ALL
GAD7 TOTAL SCORE: 0
3. WORRYING TOO MUCH ABOUT DIFFERENT THINGS: NOT AT ALL
2. NOT BEING ABLE TO STOP OR CONTROL WORRYING: NOT AT ALL
6. BECOMING EASILY ANNOYED OR IRRITABLE: NOT AT ALL
5. BEING SO RESTLESS THAT IT IS HARD TO SIT STILL: NOT AT ALL

## 2021-04-23 ASSESSMENT — PATIENT HEALTH QUESTIONNAIRE - PHQ9
5. POOR APPETITE OR OVEREATING: NOT AT ALL
SUM OF ALL RESPONSES TO PHQ QUESTIONS 1-9: 0

## 2021-04-23 NOTE — TELEPHONE ENCOUNTER
Last MRI Lumbar Spine 2019.  Patient would like to consider low back injections.  Last office visit 2-, anxiety/depression.    Patient confirms that the change in medications have helped.  Carol Tilley LPN 4/23/2021 3:07 PM

## 2021-04-23 NOTE — TELEPHONE ENCOUNTER
Pt called and stated that he would like a Cortizone injection for his lower back and has some questions about scheduling if you could call him thank you.

## 2021-04-24 ASSESSMENT — ANXIETY QUESTIONNAIRES: GAD7 TOTAL SCORE: 0

## 2021-04-26 ENCOUNTER — TELEPHONE (OUTPATIENT)
Dept: FAMILY MEDICINE | Facility: OTHER | Age: 46
End: 2021-04-26

## 2021-04-26 DIAGNOSIS — Z11.3 SCREEN FOR STD (SEXUALLY TRANSMITTED DISEASE): Primary | ICD-10-CM

## 2021-04-26 NOTE — TELEPHONE ENCOUNTER
Patient said reason for rosibel personal, will talk to PBI.     Lakeisha Rojo on 4/26/2021 at 8:09 AM

## 2021-04-26 NOTE — TELEPHONE ENCOUNTER
Patient requesting to speak with PBI and does not want to disclose any information at this time.      Carol Tilley LPN 4/26/2021 8:47 AM

## 2021-04-30 ENCOUNTER — TELEPHONE (OUTPATIENT)
Dept: FAMILY MEDICINE | Facility: OTHER | Age: 46
End: 2021-04-30

## 2021-04-30 NOTE — TELEPHONE ENCOUNTER
Follow up to a previous phone call; letting you know that he was treated for a UTI down in [place].

## 2021-05-10 ENCOUNTER — TELEPHONE (OUTPATIENT)
Dept: FAMILY MEDICINE | Facility: OTHER | Age: 46
End: 2021-05-10

## 2021-05-10 NOTE — TELEPHONE ENCOUNTER
Patient is requesting an appointment. Transferred to scheduling.  Norma J. Gosselin, LPN .......  5/10/2021  10:43 AM

## 2021-05-10 NOTE — TELEPHONE ENCOUNTER
PBI-patient wants to come in for labs from being treated in [place] for a UTI    Please call and advise    Thank You  Princess Argueta on 5/10/2021 at 10:30 AM

## 2021-05-11 ENCOUNTER — OFFICE VISIT (OUTPATIENT)
Dept: FAMILY MEDICINE | Facility: OTHER | Age: 46
End: 2021-05-11
Attending: FAMILY MEDICINE
Payer: COMMERCIAL

## 2021-05-11 VITALS
TEMPERATURE: 98.4 F | OXYGEN SATURATION: 98 % | DIASTOLIC BLOOD PRESSURE: 84 MMHG | RESPIRATION RATE: 16 BRPM | BODY MASS INDEX: 24.77 KG/M2 | HEIGHT: 69 IN | HEART RATE: 82 BPM | WEIGHT: 167.25 LBS | SYSTOLIC BLOOD PRESSURE: 160 MMHG

## 2021-05-11 DIAGNOSIS — Z11.3 SCREEN FOR STD (SEXUALLY TRANSMITTED DISEASE): ICD-10-CM

## 2021-05-11 DIAGNOSIS — R35.0 URINARY FREQUENCY: Primary | ICD-10-CM

## 2021-05-11 LAB
ALBUMIN UR-MCNC: NEGATIVE MG/DL
APPEARANCE UR: CLEAR
BILIRUB UR QL STRIP: NEGATIVE
C TRACH DNA SPEC QL NAA+PROBE: NOT DETECTED
COLOR UR AUTO: NORMAL
GLUCOSE UR STRIP-MCNC: NEGATIVE MG/DL
HGB UR QL STRIP: NEGATIVE
KETONES UR STRIP-MCNC: NEGATIVE MG/DL
LEUKOCYTE ESTERASE UR QL STRIP: NEGATIVE
N GONORRHOEA DNA SPEC QL NAA+PROBE: NOT DETECTED
NITRATE UR QL: NEGATIVE
PH UR STRIP: 6 PH (ref 5–7)
SOURCE: NORMAL
SP GR UR STRIP: 1.01 (ref 1–1.03)
SPECIMEN SOURCE: NORMAL
UROBILINOGEN UR STRIP-MCNC: NORMAL MG/DL (ref 0–2)

## 2021-05-11 PROCEDURE — 81003 URINALYSIS AUTO W/O SCOPE: CPT | Mod: ZL | Performed by: FAMILY MEDICINE

## 2021-05-11 PROCEDURE — 87491 CHLMYD TRACH DNA AMP PROBE: CPT | Mod: ZL | Performed by: FAMILY MEDICINE

## 2021-05-11 PROCEDURE — 99213 OFFICE O/P EST LOW 20 MIN: CPT | Performed by: FAMILY MEDICINE

## 2021-05-11 PROCEDURE — 87591 N.GONORRHOEAE DNA AMP PROB: CPT | Mod: ZL | Performed by: FAMILY MEDICINE

## 2021-05-11 ASSESSMENT — ANXIETY QUESTIONNAIRES
2. NOT BEING ABLE TO STOP OR CONTROL WORRYING: SEVERAL DAYS
6. BECOMING EASILY ANNOYED OR IRRITABLE: NOT AT ALL
5. BEING SO RESTLESS THAT IT IS HARD TO SIT STILL: NOT AT ALL
3. WORRYING TOO MUCH ABOUT DIFFERENT THINGS: NOT AT ALL
GAD7 TOTAL SCORE: 2
1. FEELING NERVOUS, ANXIOUS, OR ON EDGE: SEVERAL DAYS
7. FEELING AFRAID AS IF SOMETHING AWFUL MIGHT HAPPEN: NOT AT ALL
IF YOU CHECKED OFF ANY PROBLEMS ON THIS QUESTIONNAIRE, HOW DIFFICULT HAVE THESE PROBLEMS MADE IT FOR YOU TO DO YOUR WORK, TAKE CARE OF THINGS AT HOME, OR GET ALONG WITH OTHER PEOPLE: NOT DIFFICULT AT ALL

## 2021-05-11 ASSESSMENT — MIFFLIN-ST. JEOR: SCORE: 1620.05

## 2021-05-11 ASSESSMENT — PATIENT HEALTH QUESTIONNAIRE - PHQ9
5. POOR APPETITE OR OVEREATING: NOT AT ALL
SUM OF ALL RESPONSES TO PHQ QUESTIONS 1-9: 0

## 2021-05-11 ASSESSMENT — PAIN SCALES - GENERAL: PAINLEVEL: MILD PAIN (3)

## 2021-05-11 NOTE — NURSING NOTE
Patient presents to clinic experiencing urinary frequency, retention and discomfort with urination.  He was given an injection and Medication for UTI in [place] by the [facility] physician.  Medication Reconciliation: complete    Keke Helton LPN

## 2021-05-11 NOTE — PROGRESS NOTES
Nursing Notes:   Keke Helton LPN  5/11/2021 10:14 AM  Signed  Patient presents to clinic experiencing urinary frequency, retention and discomfort with urination.  He was given an injection and Medication for UTI in [place] by the [facility] physician.  Medication Reconciliation: complete    Keke Helton LPN         Assessment & Plan       ICD-10-CM    1. Urinary frequency  R35.0 UA reflex to Microscopic     UA reflex to Microscopic   2. Screen for STD (sexually transmitted disease)  Z11.3 GC/Chlamydia by PCR       The ceftriaxone covers gonorrhea and ofloxacin should cover typical UTI pathogens. Did not receive chlamydia coverage by antibiotics from [place]  Recommend UA and GC chlamydia testing to evaluate for clearance vs ongoing untreated infection  Both tests return negative  We discussed urinary urgency from caffeine, spicy or citrus foods. Can cut back on those times and see if symptoms improve.    Follow up as needed     Humble Arroyo MD     [facility]      SUBJECTIVE:  46 year old male presents for some ongoing urinary frequency.   Called prior to a trip to [place] about urinary concerns regarding intercourse with another woman  He did not come in for GC or chlamydia testing  Went to [place] with wife  Saw a doctor at the [facility] and was given IM ceftriaxone and 2 prescriptions  It looks like they were Nalidixic-acid every 8 hours for 5 days and Ofloxacin 400 mg daily for 7 days  He noticed improved 2 days later. Stopped the nalidixic acid.  Continues to have some urinary urgency, but no dysuria or penile discharge.  Other woman he had intercourse with tested negative for STDs.    REVIEW OF SYSTEMS:    Pertinent items are noted in HPI.    Current Outpatient Medications   Medication Sig Dispense Refill     buPROPion (WELLBUTRIN XL) 150 MG 24 hr tablet Take 1 tablet (150 mg) by mouth every morning 90 tablet 4     No Known Allergies    OBJECTIVE:  BP (!) 160/84 (BP Location: Right arm,  "Patient Position: Sitting, Cuff Size: Adult Regular)   Pulse 82   Temp 98.4  F (36.9  C) (Tympanic)   Resp 16   Ht 1.746 m (5' 8.75\")   Wt 75.9 kg (167 lb 4 oz)   SpO2 98%   BMI 24.88 kg/m      EXAM:  General Appearance: Alert. No acute distress  Psychiatric: Normal affect and mentation      Results for orders placed or performed in visit on 05/11/21   UA reflex to Microscopic     Status: None   Result Value Ref Range    Color Urine Light Yellow     Appearance Urine Clear     Glucose Urine Negative NEG^Negative mg/dL    Bilirubin Urine Negative NEG^Negative    Ketones Urine Negative NEG^Negative mg/dL    Specific Gravity Urine 1.010 1.003 - 1.035    Blood Urine Negative NEG^Negative    pH Urine 6.0 5.0 - 7.0 pH    Protein Albumin Urine Negative NEG^Negative mg/dL    Urobilinogen mg/dL Normal 0.0 - 2.0 mg/dL    Nitrite Urine Negative NEG^Negative    Leukocyte Esterase Urine Negative NEG^Negative    Source Midstream Urine    GC/Chlamydia by PCR     Status: None    Specimen: Urine   Result Value Ref Range    Specimen Source Urine     Neisseria gonorrhoreae PCR Not Detected NDET^Not Detected    Chlamydia Trachomatis PCR Not Detected NDET^Not Detected       "

## 2021-05-12 ASSESSMENT — ANXIETY QUESTIONNAIRES: GAD7 TOTAL SCORE: 2

## 2021-05-13 ENCOUNTER — HOSPITAL ENCOUNTER (OUTPATIENT)
Dept: MRI IMAGING | Facility: OTHER | Age: 46
End: 2021-05-13
Attending: FAMILY MEDICINE
Payer: COMMERCIAL

## 2021-05-13 ENCOUNTER — HOSPITAL ENCOUNTER (OUTPATIENT)
Dept: GENERAL RADIOLOGY | Facility: OTHER | Age: 46
End: 2021-05-13
Attending: FAMILY MEDICINE
Payer: COMMERCIAL

## 2021-05-13 DIAGNOSIS — M51.369 DEGENERATION OF LUMBAR INTERVERTEBRAL DISC: ICD-10-CM

## 2021-05-13 DIAGNOSIS — M54.50 CHRONIC RIGHT-SIDED LOW BACK PAIN WITHOUT SCIATICA: ICD-10-CM

## 2021-05-13 DIAGNOSIS — G89.29 CHRONIC RIGHT-SIDED LOW BACK PAIN WITHOUT SCIATICA: ICD-10-CM

## 2021-05-13 PROCEDURE — 62323 NJX INTERLAMINAR LMBR/SAC: CPT

## 2021-05-13 PROCEDURE — 250N000011 HC RX IP 250 OP 636: Performed by: RADIOLOGY

## 2021-05-13 PROCEDURE — 255N000002 HC RX 255 OP 636: Performed by: RADIOLOGY

## 2021-05-13 PROCEDURE — 250N000009 HC RX 250: Performed by: RADIOLOGY

## 2021-05-13 PROCEDURE — 72148 MRI LUMBAR SPINE W/O DYE: CPT

## 2021-05-13 RX ORDER — DEXAMETHASONE SODIUM PHOSPHATE 10 MG/ML
10 INJECTION, SOLUTION INTRAMUSCULAR; INTRAVENOUS ONCE
Status: COMPLETED | OUTPATIENT
Start: 2021-05-13 | End: 2021-05-13

## 2021-05-13 RX ORDER — LIDOCAINE HYDROCHLORIDE 10 MG/ML
2 INJECTION, SOLUTION EPIDURAL; INFILTRATION; INTRACAUDAL; PERINEURAL ONCE
Status: COMPLETED | OUTPATIENT
Start: 2021-05-13 | End: 2021-05-13

## 2021-05-13 RX ORDER — LIDOCAINE HYDROCHLORIDE 10 MG/ML
2 INJECTION, SOLUTION INFILTRATION; PERINEURAL ONCE
Status: COMPLETED | OUTPATIENT
Start: 2021-05-13 | End: 2021-05-13

## 2021-05-13 RX ADMIN — LIDOCAINE HYDROCHLORIDE 2 ML: 10 INJECTION, SOLUTION EPIDURAL; INFILTRATION; INTRACAUDAL; PERINEURAL at 14:12

## 2021-05-13 RX ADMIN — DEXAMETHASONE SODIUM PHOSPHATE 10 MG: 10 INJECTION, SOLUTION INTRAMUSCULAR; INTRAVENOUS at 14:06

## 2021-05-13 RX ADMIN — IOHEXOL 2 ML: 240 INJECTION, SOLUTION INTRATHECAL; INTRAVASCULAR; INTRAVENOUS; ORAL at 14:07

## 2021-05-13 RX ADMIN — LIDOCAINE HYDROCHLORIDE 2 ML: 10 INJECTION, SOLUTION INFILTRATION; PERINEURAL at 14:09

## 2021-10-09 ENCOUNTER — HEALTH MAINTENANCE LETTER (OUTPATIENT)
Age: 46
End: 2021-10-09

## 2021-12-21 ENCOUNTER — OFFICE VISIT (OUTPATIENT)
Dept: FAMILY MEDICINE | Facility: OTHER | Age: 46
End: 2021-12-21
Attending: FAMILY MEDICINE
Payer: COMMERCIAL

## 2021-12-21 VITALS
RESPIRATION RATE: 16 BRPM | OXYGEN SATURATION: 98 % | SYSTOLIC BLOOD PRESSURE: 122 MMHG | DIASTOLIC BLOOD PRESSURE: 82 MMHG | TEMPERATURE: 96.9 F | BODY MASS INDEX: 25.44 KG/M2 | WEIGHT: 171 LBS | HEART RATE: 76 BPM

## 2021-12-21 DIAGNOSIS — G89.29 CHRONIC BILATERAL LOW BACK PAIN WITHOUT SCIATICA: ICD-10-CM

## 2021-12-21 DIAGNOSIS — M47.816 FACET ARTHROPATHY, LUMBAR: ICD-10-CM

## 2021-12-21 DIAGNOSIS — S13.9XXA ACUTE NECK SPRAIN, INITIAL ENCOUNTER: Primary | ICD-10-CM

## 2021-12-21 DIAGNOSIS — M54.50 CHRONIC BILATERAL LOW BACK PAIN WITHOUT SCIATICA: ICD-10-CM

## 2021-12-21 PROCEDURE — 99213 OFFICE O/P EST LOW 20 MIN: CPT | Performed by: FAMILY MEDICINE

## 2021-12-21 RX ORDER — BUPROPION HYDROCHLORIDE 300 MG/1
300 TABLET ORAL DAILY
COMMUNITY
Start: 2021-10-29 | End: 2023-02-23

## 2021-12-21 ASSESSMENT — PAIN SCALES - GENERAL: PAINLEVEL: EXTREME PAIN (8)

## 2021-12-21 NOTE — NURSING NOTE
"Patient presents to the clinic for neck pain over the past 3 weeks, patient was weight-lifting prior to pain starting.    FOOD SECURITY SCREENING QUESTIONS:    The next two questions are to help us understand your food security.  If you are feeling you need any assistance in this area, we have resources available to support you today.    Hunger Vital Signs:  Within the past 12 months we worried whether our food would run out before we got money to buy more. Never  Within the past 12 months the food we bought just didn't last and we didn't have money to get more. Never    Advance Care Directive on file? no  Advance Care Directive provided to patient? Declined    Chief Complaint   Patient presents with     Musculoskeletal Problem       Initial /82 (BP Location: Right arm, Patient Position: Sitting, Cuff Size: Adult Large)   Pulse 76   Temp 96.9  F (36.1  C) (Tympanic)   Resp 16   Wt 77.6 kg (171 lb)   SpO2 98%   BMI 25.44 kg/m   Estimated body mass index is 25.44 kg/m  as calculated from the following:    Height as of 5/11/21: 1.746 m (5' 8.75\").    Weight as of this encounter: 77.6 kg (171 lb).  Medication Reconciliation: complete        Carol Tilley LPN       "

## 2021-12-21 NOTE — PROGRESS NOTES
"Nursing Notes:   aCrol Tilley LPN  12/21/2021 11:31 AM  Sign at exiting of workspace  Patient presents to the clinic for neck pain over the past 3 weeks, patient was weight-lifting prior to pain starting.    FOOD SECURITY SCREENING QUESTIONS:    The next two questions are to help us understand your food security.  If you are feeling you need any assistance in this area, we have resources available to support you today.    Hunger Vital Signs:  Within the past 12 months we worried whether our food would run out before we got money to buy more. Never  Within the past 12 months the food we bought just didn't last and we didn't have money to get more. Never    Advance Care Directive on file? no  Advance Care Directive provided to patient? Declined    Chief Complaint   Patient presents with     Musculoskeletal Problem       Initial /82 (BP Location: Right arm, Patient Position: Sitting, Cuff Size: Adult Large)   Pulse 76   Temp 96.9  F (36.1  C) (Tympanic)   Resp 16   Wt 77.6 kg (171 lb)   SpO2 98%   BMI 25.44 kg/m   Estimated body mass index is 25.44 kg/m  as calculated from the following:    Height as of 5/11/21: 1.746 m (5' 8.75\").    Weight as of this encounter: 77.6 kg (171 lb).  Medication Reconciliation: complete        Carol Tilley LPN            Assessment & Plan       ICD-10-CM    1. Acute neck sprain, initial encounter  S13.9XXA Chiropractic Referral   2. Facet arthropathy, lumbar  M47.816 Occupational Medicine Referral   3. Chronic bilateral low back pain without sciatica  M54.50 Occupational Medicine Referral    G89.29        He did not have a fall or direct trauma to his neck.  Imaging does not seem warranted at this time.  Sounds like he has a subluxed vertebrae.  Recommend chiropractic adjustment to help.  Referral placed to see Dr. Dotson.    For his chronic low back pain, discussed [facility] spine program.  Referral placed for evaluation with Dr. Pierre to see if he is a suitable " candidate.    Follow up as needed         Humble Arroyo MD     [facility]      SUBJECTIVE:  46 year old male presents for neck pain for a few weeks  He felt a pop in his inferior neck while weightlifting  As to limit workouts since then due to discomfort.  Having a hard time flexing his neck upward due to discomfort around T1.  No numbness or tingling in the arms.  No weakness.    Has chronic low back pain.  Has been through physical therapy.  Engages in regular exercise.  Earlier in the year he had some more difficulty with right side back pain and was found to have L4-5 bilateral facet edema and right facet effusion.  Received a lumbar epidural with some improvement.  However, is still dealing with chronic back pain.      REVIEW OF SYSTEMS:    Pertinent items are noted in HPI.    Current Outpatient Medications   Medication Sig Dispense Refill     buPROPion (WELLBUTRIN XL) 300 MG 24 hr tablet Take 300 mg by mouth daily       No Known Allergies    OBJECTIVE:  /82 (BP Location: Right arm, Patient Position: Sitting, Cuff Size: Adult Large)   Pulse 76   Temp 96.9  F (36.1  C) (Tympanic)   Resp 16   Wt 77.6 kg (171 lb)   SpO2 98%   BMI 25.44 kg/m      EXAM:  General Appearance: Pleasant, alert, appropriate appearance for age. No acute distress  Musculoskeletal Exam: Tender around C7 and T1.  No significant paraspinous muscle tenderness.   Neurologic Exam: reflexes 2+, strength symmetric upper extremities.  Negative Spurling sign  Psychiatric: Normal affect and mentation

## 2021-12-23 ENCOUNTER — THERAPY VISIT (OUTPATIENT)
Dept: CHIROPRACTIC MEDICINE | Facility: OTHER | Age: 46
End: 2021-12-23
Attending: CHIROPRACTOR
Payer: COMMERCIAL

## 2021-12-23 VITALS
SYSTOLIC BLOOD PRESSURE: 130 MMHG | HEART RATE: 78 BPM | DIASTOLIC BLOOD PRESSURE: 78 MMHG | RESPIRATION RATE: 17 BRPM | TEMPERATURE: 95.6 F | OXYGEN SATURATION: 95 %

## 2021-12-23 DIAGNOSIS — S13.9XXA ACUTE NECK SPRAIN, INITIAL ENCOUNTER: ICD-10-CM

## 2021-12-23 DIAGNOSIS — M99.01 SEGMENTAL AND SOMATIC DYSFUNCTION OF CERVICAL REGION: Primary | ICD-10-CM

## 2021-12-23 DIAGNOSIS — M99.02 SEGMENTAL AND SOMATIC DYSFUNCTION OF THORACIC REGION: ICD-10-CM

## 2021-12-23 PROCEDURE — 98940 CHIROPRACT MANJ 1-2 REGIONS: CPT | Mod: AT | Performed by: CHIROPRACTOR

## 2021-12-23 PROCEDURE — 99213 OFFICE O/P EST LOW 20 MIN: CPT | Mod: 25 | Performed by: CHIROPRACTOR

## 2021-12-23 NOTE — PROGRESS NOTES
Neck feels constant, dull, and creates weakness. 7/10 W24 8/10. Has tried heat and cold, has not given any chance in pain. Started three weeks ago.  Benjamin Dotson DC on 12/23/2021 at 12:53 PM    PATIENT:  Salomón Edwards is a 46 year old male presenting for neck/upper back pain    PROBLEM:   Date of Initial Visit for this Episode:  12/23/2021    Visit #1    SUBJECTIVE / HPI: weightlifting described incline flies and bar press downs. Saw Dr. Cruz OLIVARES, referred patient to our office.  No reported traumatic events associated with onset of symptoms.  Denies any radicular complaints.  Reports history of low back concerns but rarely neck and upper back.  Has seen Dr. Narinder Dsouza chiropractor in [facility] for the symptoms.  Sought massage therapy session.  Helped somewhat however therapist noted that it was believed that chiropractic care may also be beneficial.  Description and onset:  Duration and Frequency of Pain: 3 weeks and constantly dull  Radiation of pain: no  Pain rated at it's worst: 8/10  Pain rated currently:  7/10  Pain course: Not changing  Improved by:  Heat and massage  Additional Features: unremarkable  Other Health Care Providers seen for this: Nilda RICHEY  Previous treatment: History of chiropractic care for lower back symptoms, massage  Previous injury:history possible fracture TL junction region.     See flowsheets in chart for details.  12/23/2021   Neck Disability Index (  Juan [employer] MEYER. and [employer]. 1991. All rights reserved.; used with permission) 12/23/2021   SECTION 1 - PAIN INTENSITY 3   SECTION 2 - PERSONAL CARE 1   SECTION 3 - LIFTING 1   SECTION 4 - READING 1   SECTION 5 - HEADACHES 0   SECTION 6 - CONCENTRATION 0   SECTION 7 - WORK 3   SECTION 8 - DRIVING 2   SECTION 9 - SLEEPING 3   SECTION 10 - RECREATION 3   Count 10   Sum 17   Raw Score: /50 17   Neck Disability Index Score: (%) 34       Past D.C. Care: yes, helpful      PAST MEDICAL HISTORY:  Past Medical History:   Diagnosis  Date     Chronic sprain or strain of lumbar region     Overview:  hx of flares     Closed fracture of talus     6/15/04,Question of     Pneumonia     2/17/05       PAST SURGICAL HISTORY:  Past Surgical History:   Procedure Laterality Date     EXTRACTION(S) DENTAL       HERNIA REPAIR, INGUINAL RT/LT Left        ALLERGIES:  No Known Allergies    CURRENT MEDICATIONS:  Current Outpatient Medications   Medication Sig Dispense Refill     buPROPion (WELLBUTRIN XL) 300 MG 24 hr tablet Take 300 mg by mouth daily         SOCIAL HISTORY:  Marital Status: .  Occupation: [employer].  Alcohol use:none.  Tobacco use: Smoker: no.      FAMILY HISTORY:  Family History   Problem Relation Age of Onset     Heart Disease Father 65        Heart Disease,CAD with stents     Substance Abuse Father         Alcohol/Drug,Smoker     Other - See Comments Father         Overweight     Liver Disease Father         Liver disease     Lymphoma Father         Hodkins Lymphoma     Heart Disease Maternal Grandfather 55        Heart Disease,MI     Substance Abuse Maternal Grandfather         Alcohol/Drug,Smoker     Other - See Comments Mother         Fibromyalgia/Scoliosis/Low back DJD     No Known Problems Sister      No Known Problems Brother      Diabetes No family hx of         Diabetes     Colon Cancer No family hx of         Cancer-colon     Prostate Cancer No family hx of        Patient Active Problem List   Diagnosis     Chronic sprain or strain of lumbar region     Chronic right-sided low back pain without sciatica     Partial tear of right rotator cuff     DDD (degenerative disc disease), lumbar         ROS:  The patient denies any fevers, chills, nausea, vomiting, diarrhea, constipation,dysuria, hematuria, or urinary hesitancy or incontinence.  No shortness of breath, chest pain, or rashes.    OBJECTIVE:    DIAGNOSTICS:  No current spinal imaging taken.     PHYSICAL EXAM:   /78 (BP Location: Right arm, Patient  Position: Sitting)   Pulse 78   Temp (!) 95.6  F (35.3  C) (Tympanic)   Resp 17   SpO2 95%   GENERAL APPEARANCE: healthy, alert, active, no distress and cooperative   GAIT: NORMAL    MUSCULOSKELETAL:   Posture: Anterior head carriage, rounded shoulders.Level shoulder.    Gait:  unremarkable.     Cervical performed actively, measured approximately  ROM:   smooth/halting arc of motion   45/50 flexion    35/45 extension    40/45 RLF  30/45 LLF    80/85 RR         80/85 LR       -Maximal Foraminal Compression: +focal mild neck pain.   -Distraction improves    +Tenderness: left side C1, right side C2, CT junction bilaterally T6 on right  +Muscle spasm: upper trapezius, rhomboids, scalenes  +Joint asymmetry and restriction: C1 right lateral flexion and left rotation, C2 extension and left lateral flexion, T1 extension restriction, T2 extension and left lateral flexion restriction, T6 extension restriction    ASSESSMENT: Salomón Edwards is a 46 year old male presenting with primary complaints of neck and upper back pain.  Segmental/somatic dysfunction present of the cervical and upper thoracic regions both consistent with patient's symptoms.  Patient seems to be excellent candidate for chiropractic care regarding these concerns.  No contraindications precluding patient from receiving care today.     1. Segmental and somatic dysfunction of cervical region    2. Acute neck sprain, initial encounter    3. Segmental and somatic dysfunction of thoracic region        PLAN    Evaluation and Management:  03944 Moderate exam established patient 20 min    Procedures:  Modalities:  None performed this visit    CMT:  19447 Chiropractic manipulative treatment 1-2 regions performed   Cervical: Diversified, C1 , C2, Supine  Thoracic: Diversified, T1, T2, T6, Prone    Therapeutic procedures:  None    Response to Treatment  Reduction in symptoms as reported by patient    Prognosis: Excellent    12/23/2021 Plan of Care: 4-6 visits of  Chiropractic Care including Spinal Adjustments and/or physiotherapy and active rehabilitation, to include exercises in the office and/or at home to meet care plan goals.     Frequency: 1-2xweek for up to 4 weeks. A reevaluation would be clinically appropriate in 6 visits, to determine progress and further course of care.    POC discussed and patient agreeable to plan of care.      12/23/2021 Goals:      Patient will report improved pain.   Patient will report able to return to working out without painful limits.   Patient will demonstrate an improved ability to complete Activities of Daily Living  as shown by a reported 10-30% reduced score on neck  index.    Patient will demonstrate improved ROM.        INSTRUCTIONS   heat 15 minutes every other hour as needed    Follow-up:  Return to care in 1 week.

## 2022-01-04 ENCOUNTER — OFFICE VISIT (OUTPATIENT)
Dept: FAMILY MEDICINE | Facility: OTHER | Age: 47
End: 2022-01-04
Attending: FAMILY MEDICINE
Payer: COMMERCIAL

## 2022-01-04 VITALS
DIASTOLIC BLOOD PRESSURE: 88 MMHG | BODY MASS INDEX: 25.59 KG/M2 | RESPIRATION RATE: 16 BRPM | TEMPERATURE: 98.4 F | WEIGHT: 172 LBS | SYSTOLIC BLOOD PRESSURE: 130 MMHG | OXYGEN SATURATION: 97 % | HEART RATE: 90 BPM

## 2022-01-04 DIAGNOSIS — G89.29 CHRONIC BILATERAL LOW BACK PAIN WITHOUT SCIATICA: ICD-10-CM

## 2022-01-04 DIAGNOSIS — M54.50 CHRONIC BILATERAL LOW BACK PAIN WITHOUT SCIATICA: ICD-10-CM

## 2022-01-04 DIAGNOSIS — M47.816 FACET ARTHROPATHY, LUMBAR: ICD-10-CM

## 2022-01-04 PROCEDURE — 99207 PR BACK PROGRAM: CPT | Performed by: CHIROPRACTOR

## 2022-01-04 ASSESSMENT — PATIENT HEALTH QUESTIONNAIRE - PHQ9
10. IF YOU CHECKED OFF ANY PROBLEMS, HOW DIFFICULT HAVE THESE PROBLEMS MADE IT FOR YOU TO DO YOUR WORK, TAKE CARE OF THINGS AT HOME, OR GET ALONG WITH OTHER PEOPLE: NOT DIFFICULT AT ALL
SUM OF ALL RESPONSES TO PHQ QUESTIONS 1-9: 1
SUM OF ALL RESPONSES TO PHQ QUESTIONS 1-9: 1

## 2022-01-04 ASSESSMENT — PAIN SCALES - GENERAL: PAINLEVEL: SEVERE PAIN (6)

## 2022-01-04 NOTE — NURSING NOTE
Salomón Edwards is a 46 year old male presenting today to be evaluated for the Spine Program.  Referred by: Humble Arroyo MD  PCP Humble Arroyo MD   Diagnosis: Chronic bilateral low back pain without sciatica      Medication Reconciliation: complete  Advanced Care Directive reviewed.    Review Of Systems  Skin: negative  Eyes: negative  Ears/Nose/Throat: negative  Respiratory: No shortness of breath, dyspnea on exertion, cough, or hemoptysis  Cardiovascular: negative  Gastrointestinal: negative  Genitourinary: negative  Musculoskeletal: positive for back pain  Neurologic: negative  Psychiatric: negative  Hematologic/Lymphatic/Immunologic: negative  Endocrine: negative    Griselda Gurrola LPN  1/4/2022 2:00 PM

## 2022-01-04 NOTE — PROGRESS NOTES
Spine Therapy Program Consultation Report      Patient referred by: Dr. Humble Arroyo    Condition:  Facet arthropathy of the lumbar spine, Chronic bilateral low back pain    Related studies reviewed:    2021 MRI Lumbar spine  2019 MRI Lumbar spine      Candidate for program: YES    This is a pleasant 47 y/o male referred by Dr. Arroyo for possible Spine Therapy Program [facility] management. He has an extensive history of chronic LBP that is reviewed today. Most concerning to him is increasing pain getting up from a standing position, weakness, and recurrent pain with lifting objects.  Salomón notes having multiple chiropractic treatments and injection that did not help.  He is interested in pursuing more conventional therapy prior to invasive procedures.  He has not consulted with neurosurgery for these issues.    Objective:  Review of prior MR imaging demonstrate progressive facet degeneration at L4-5.  There is loss of disc height with disc bulge occurring at L3-4, L4-5 and L5-S1.  Modic type III deformity changes are noted at L5-S1. Normal lordosis with some early anterior osteophytosis. He has right SI joint dysfunction with lumbar spasm bilaterally.      Plan: Patient is referred to our STP.  I also recommended he start a bicycling exercise program.  Patient asked about receiving chiropractic care to which I approved with provisions of specific adjustment protocols.  He will let me know if he wants to proceed with this prior to starting the program.     Note to: Dr. Humble Arroyo. Thank you for allowing me to be a part of this patient's care plan.    Referral placed: [facility] Spine Program      Onel Aquino DC, ARIES, MARIA T  Director - Occupational Medicine Department  Board Certified Diplomate - American Board of Forensic Professionals  Board Certified - American Board of Independent Medical Examiners  [facility]  [address] [place] [id number]  Phone  [phone]  Fax [phone]    2:54 PM 1/4/2022    Answers for HPI/ROS submitted by the patient on 12/8/2021  If you checked off any problems, how difficult have these problems made it for you to do your work, take care of things at home, or get along with other people?: Somewhat difficult  PHQ9 TOTAL SCORE: 15    Fax [phone]    2:54 PM 1/4/2022    Answers for HPI/ROS submitted by the patient on 1/4/2022  If you checked off any problems, how difficult have these problems made it for you to do your work, take care of things at home, or get along with other people?: Not difficult at all  PHQ9 TOTAL SCORE: 1  Depression/Anxiety: Depression  Status since last visit:: better  Other associated symptoms of depression:: Yes  Significant life event: : health concerns  Anxious:: No  Current substance use:: No  How many servings of fruits and vegetables do you eat daily?: 2-3  On average, how many sweetened beverages do you drink each day (Examples: soda, juice, sweet tea, etc.  Do NOT count diet or artificially sweetened beverages)?: 0  How many minutes a day do you exercise enough to make your heart beat faster?: 30 to 60  How many days a week do you exercise enough to make your heart beat faster?: 4  How many days per week do you miss taking your medication?: 0

## 2022-02-03 ENCOUNTER — HOSPITAL ENCOUNTER (OUTPATIENT)
Dept: PHYSICAL THERAPY | Facility: OTHER | Age: 47
Setting detail: THERAPIES SERIES
End: 2022-02-03
Attending: CHIROPRACTOR
Payer: COMMERCIAL

## 2022-02-03 PROCEDURE — 97110 THERAPEUTIC EXERCISES: CPT | Mod: GP

## 2022-02-03 PROCEDURE — 97140 MANUAL THERAPY 1/> REGIONS: CPT | Mod: GP

## 2022-02-03 PROCEDURE — 97162 PT EVAL MOD COMPLEX 30 MIN: CPT | Mod: GP

## 2022-02-03 NOTE — PROGRESS NOTES
"   02/03/22 0800   General Information   Type of Visit Initial OP Ortho PT Evaluation   Start of Care Date 02/03/22   Referring Physician Dr Onel Madrigal,[place]   Patient/Family Goals Statement Increase strength with lifting and not have back giving out, Increase mobility and decrease pain   Orders Evaluate and Treat   Medical Diagnosis Chronic bilateral low back pain without sciatica   Surgical/Medical history reviewed Yes   General Information Comments Hernia: 5th grade   Body Part(s)   Body Part(s) Lumbar Spine/SI   Presentation and Etiology   Pertinent history of current problem (include personal factors and/or comorbidities that impact the POC) Pt reports that he started having low back pain about 16 years ago. He tried lifting a boat motor and his back :seized up\" and had Right butt and leg to big toe and didn't go for a year and a half. He went to the doctor and they recommended ibuprophen and go to a chiropractor. Pt state that he then would have back pain with standing in the boat fishing, sitting in canoe, lifting canoe, getting off couch, getting out of bed, putting socks and shoes on. Most recently, he was getting ready to go to the [facility]. he was visiting with family physician and he recommended the Spine Program, and was referred to Dr Madrigal. CC is low back pain in right SI joint and occassionally down right leg to knee with prolong driving.   Impairments A. Pain;D. Decreased ROM;B. Decreased WB tolerance;E. Decreased flexibility;F. Decreased strength and endurance;H. Impaired gait;K. Numbness;L. Tingling;M. Locking or catching   Functional Limitations perform activities of daily living;perform required work activities;perform desired leisure / sports activities   Symptom Location R SI joint and low back down into R > L buttock and posterior thigh   How/Where did it occur While lifting   Onset date of current episode/exacerbation 01/04/22   Chronicity Chronic   Pain rating (0-10 point scale) Best " (/10);Worst (/10)   Best (/10) 4   Worst (/10) 9   Pain quality A. Sharp;B. Dull;C. Aching;F. Stabbing;G. Cramping   Frequency of pain/symptoms A. Constant   Pain/symptoms are: Other   Pain symptoms comment   (Prolong sitting,standing)   Pain/symptoms exacerbated by A. Sitting;B. Walking;C. Lifting;D. Carrying;I. Bending;L. Work tasks;M. Other   Pain exacerbation comment   (Driving, canoeing, fishing, yardwork)   Pain/symptoms eased by G. Heat;E. Changing positions;I. OTC medication(s)   Progression of symptoms since onset: Improved  (improved with over-counter-meds)   Current / Previous Interventions   Diagnostic Tests:   (None recently)   Current Level of Function   Patient role/employment history A. Employed   Living environment [place]   Fall Risk Screen   Fall screen completed by PT   Have you fallen 2 or more times in the past year? No   Have you fallen and had an injury in the past year? No   Is patient a fall risk? No   Abuse Screen (yes response referral indicated)   Feels Unsafe at Home or Work/School no   Feels Threatened by Someone no   Does Anyone Try to Keep You From Having Contact with Others or Doing Things Outside Your Home? no   Lumbar Spine/SI Objective Findings   Observation General Listening: R SB   Posture Forward head and neck, mild local CT kyphosis, mid-thoracic lordosis, flattening of the lower thoracic spine, localized L3 lordosis with flattening of the lower lumbar lordosis, pelvis is left rotated   Balance/Proprioception (Single Leg Stance) decreased left   Flexion  degrees   Extension ROM 10 degrees with hinging at L3   Right Side Bending ROM Fingertips just proximal knee   Left Side Bending ROM Fingertips just proximal knee with decrease curve reversal   Lumbar ROM Comment Decrease L hip drop = Right SB from below   Pelvic Screen Superior L ASIS and L pubic tubercle; L LE short in supine   Hip Screen + R KACI, + L FADIR;  decrease inferior-medial and PA glides;  + right  femoral n at inguinal ligament   Hip Flexor Flexibility + Bilaterally both in Supine Wilfred and prone with lumbar spine and pelvis stabilized   Quadricep Flexibility WFL   Piriformis Flexibility + Bilaterally   SLR 80 degrees bilaterally with + R dural tension   Slump Test + Right   Lumbar/SI Special Tests Comments + Right forward flexion and stork test   Segmental Mobility FRS-L L5, right on left backward sacral torsion, FRS-R L1, FRS-L T12   Palpation Organ Specific fascial assessment: + Right kidney with extended listening to right psoas; + right psoas tenderness   Planned Therapy Interventions   Planned Therapy Interventions joint mobilization;manual therapy;neuromuscular re-education;ROM;strengthening;stretching   Clinical Impression   Criteria for Skilled Therapeutic Interventions Met yes, treatment indicated   PT Diagnosis Low back pain with R buttock and leg pain   Influenced by the following impairments Spinal and sacral segmental dysfunctions, decrease hip mobility, myofascial tightness, dural tension, and decrease low abdominal core strength   Clinical Presentation Evolving/Changing   Clinical Decision Making (Complexity) Moderate complexity   Therapy Frequency 2 times/Week   Predicted Duration of Therapy Intervention (days/wks) 12 weeks   Risk & Benefits of therapy have been explained Yes   Patient, Family & other staff in agreement with plan of care Yes   Education Assessment   Preferred Learning Style Listening;Reading;Demonstration;Pictures/video   Barriers to Learning No barriers   ORTHO GOALS   PT Ortho Eval Goals 1;2;3;4;5   Ortho Goal 1   Goal Identifier Pain   Goal Description Pt will have minimal to no pain   Target Date 04/28/22   Ortho Goal 2   Goal Identifier Lifting   Goal Description Pt will tolerate lifting 45-50 lbs without increasing pain > 0-2/10   Target Date 04/28/22   Ortho Goal 3   Goal Identifier Standing   Goal Description Pt will tolerate standing at work or for yardwork for 45  minutes without increasing pain > 0-2/10   Target Date 04/28/22   Ortho Goal 4   Goal Identifier Recreational   Goal Description Pt will tolerate recreational hobbies such as working out, camping, biking, canoeing, fishing without increasing pain > 0-2/10   Target Date 04/28/22   Ortho Goal 5   Goal Identifier Driving   Goal Description Pt will tolerate driving 60 minutes without increasing pain > 0-2/10   Target Date 04/28/22   Total Evaluation Time   PT Eval, Moderate Complexity Minutes (95689) 30

## 2022-02-09 ENCOUNTER — HOSPITAL ENCOUNTER (OUTPATIENT)
Dept: PHYSICAL THERAPY | Facility: OTHER | Age: 47
Setting detail: THERAPIES SERIES
End: 2022-02-09
Attending: CHIROPRACTOR
Payer: COMMERCIAL

## 2022-02-09 PROCEDURE — 97140 MANUAL THERAPY 1/> REGIONS: CPT | Mod: GP

## 2022-02-09 PROCEDURE — 97110 THERAPEUTIC EXERCISES: CPT | Mod: GP

## 2022-02-11 ENCOUNTER — HOSPITAL ENCOUNTER (OUTPATIENT)
Dept: PHYSICAL THERAPY | Facility: OTHER | Age: 47
Setting detail: THERAPIES SERIES
End: 2022-02-11
Attending: CHIROPRACTOR
Payer: COMMERCIAL

## 2022-02-11 PROCEDURE — 97140 MANUAL THERAPY 1/> REGIONS: CPT | Mod: GP

## 2022-02-11 PROCEDURE — 97110 THERAPEUTIC EXERCISES: CPT | Mod: GP

## 2022-02-15 ENCOUNTER — HOSPITAL ENCOUNTER (OUTPATIENT)
Dept: PHYSICAL THERAPY | Facility: OTHER | Age: 47
Setting detail: THERAPIES SERIES
End: 2022-02-15
Attending: CHIROPRACTOR
Payer: COMMERCIAL

## 2022-02-15 PROCEDURE — 97140 MANUAL THERAPY 1/> REGIONS: CPT | Mod: GP

## 2022-02-15 PROCEDURE — 97110 THERAPEUTIC EXERCISES: CPT | Mod: GP

## 2022-02-18 ENCOUNTER — HOSPITAL ENCOUNTER (OUTPATIENT)
Dept: PHYSICAL THERAPY | Facility: OTHER | Age: 47
Setting detail: THERAPIES SERIES
End: 2022-02-18
Attending: CHIROPRACTOR
Payer: COMMERCIAL

## 2022-02-18 PROCEDURE — 97140 MANUAL THERAPY 1/> REGIONS: CPT | Mod: GP

## 2022-02-18 PROCEDURE — 97110 THERAPEUTIC EXERCISES: CPT | Mod: GP

## 2022-02-22 ENCOUNTER — HOSPITAL ENCOUNTER (OUTPATIENT)
Dept: PHYSICAL THERAPY | Facility: OTHER | Age: 47
Setting detail: THERAPIES SERIES
End: 2022-02-22
Attending: CHIROPRACTOR
Payer: COMMERCIAL

## 2022-02-22 PROCEDURE — 97110 THERAPEUTIC EXERCISES: CPT | Mod: GP

## 2022-02-22 PROCEDURE — 97140 MANUAL THERAPY 1/> REGIONS: CPT | Mod: GP

## 2022-03-01 ENCOUNTER — HOSPITAL ENCOUNTER (OUTPATIENT)
Dept: PHYSICAL THERAPY | Facility: OTHER | Age: 47
Setting detail: THERAPIES SERIES
End: 2022-03-01
Attending: CHIROPRACTOR
Payer: COMMERCIAL

## 2022-03-01 PROCEDURE — 97112 NEUROMUSCULAR REEDUCATION: CPT | Mod: GP

## 2022-03-01 PROCEDURE — 97110 THERAPEUTIC EXERCISES: CPT | Mod: GP

## 2022-03-01 PROCEDURE — 97140 MANUAL THERAPY 1/> REGIONS: CPT | Mod: GP

## 2022-03-04 ENCOUNTER — HOSPITAL ENCOUNTER (OUTPATIENT)
Dept: PHYSICAL THERAPY | Facility: OTHER | Age: 47
Setting detail: THERAPIES SERIES
End: 2022-03-04
Attending: CHIROPRACTOR
Payer: COMMERCIAL

## 2022-03-04 PROCEDURE — 97110 THERAPEUTIC EXERCISES: CPT | Mod: GP

## 2022-03-08 ENCOUNTER — OFFICE VISIT (OUTPATIENT)
Dept: FAMILY MEDICINE | Facility: OTHER | Age: 47
End: 2022-03-08
Attending: CHIROPRACTOR
Payer: COMMERCIAL

## 2022-03-08 ENCOUNTER — HOSPITAL ENCOUNTER (OUTPATIENT)
Dept: PHYSICAL THERAPY | Facility: OTHER | Age: 47
Setting detail: THERAPIES SERIES
End: 2022-03-08
Attending: CHIROPRACTOR
Payer: COMMERCIAL

## 2022-03-08 VITALS
HEART RATE: 78 BPM | SYSTOLIC BLOOD PRESSURE: 110 MMHG | HEIGHT: 69 IN | WEIGHT: 170 LBS | RESPIRATION RATE: 14 BRPM | TEMPERATURE: 97.9 F | DIASTOLIC BLOOD PRESSURE: 84 MMHG | OXYGEN SATURATION: 97 % | BODY MASS INDEX: 25.18 KG/M2

## 2022-03-08 DIAGNOSIS — M54.50 CHRONIC BILATERAL LOW BACK PAIN WITHOUT SCIATICA: ICD-10-CM

## 2022-03-08 DIAGNOSIS — G89.29 CHRONIC BILATERAL LOW BACK PAIN WITHOUT SCIATICA: ICD-10-CM

## 2022-03-08 DIAGNOSIS — M47.816 FACET ARTHROPATHY, LUMBAR: Primary | ICD-10-CM

## 2022-03-08 DIAGNOSIS — M99.05 SEGMENTAL DYSFUNCTION OF PELVIC REGION: ICD-10-CM

## 2022-03-08 PROCEDURE — 97140 MANUAL THERAPY 1/> REGIONS: CPT | Mod: GP

## 2022-03-08 PROCEDURE — 97112 NEUROMUSCULAR REEDUCATION: CPT | Mod: GP

## 2022-03-08 PROCEDURE — 99207 PR BACK PROGRAM: CPT | Performed by: CHIROPRACTOR

## 2022-03-08 PROCEDURE — 97110 THERAPEUTIC EXERCISES: CPT | Mod: GP

## 2022-03-08 ASSESSMENT — PAIN SCALES - GENERAL: PAINLEVEL: SEVERE PAIN (6)

## 2022-03-08 NOTE — NURSING NOTE
Salomón MANSI Jerry is a 47 year old male presenting today for the 1st follow up to the Spine Program.          Griselda Gurrola LPN  3/8/2022 1:06 PM

## 2022-03-08 NOTE — PROGRESS NOTES
"Spine Therapy Program Mid-treatment Report      Condition:  Facet arthropathy of the lumbar spine, Chronic bilateral low back pain     Benny returns today for follow-up during his spine therapy program. He reports improvement in flexibility and strength.  Still having a dull ache pain with prolonged standing and is experiencing \"pops\" up and down his spine with some movements.  Most pain is in the left SI joint region today and he notes sleeping on the floor to help with this.    Objective: Improved lumbar spasm and lordosis.  Left ilium fixation PIEX. Left Sacrum P-L.     Plan: Continue with therapy plan as scheduled.  I also would like to incorporate some chiropractic treatment to the left SI joint to address fixation noted above. Referral sent and spoke with Dr. Dotson about the case.  Follow up again in 4 weeks.  Patient may require care beyond the original 8 week plan based on findings today.    Referral placed: [facility] chiropractic department      Onel Aquino DC, ARIES, MARIA T  Director - Occupational Medicine Department  Diplomate - American Board of Forensic Professionals  Board Certified - American Board of Independent Medical Examiners  [facility] and [address] [address]  Phone [phone]  Fax [phone]    2:44 PM 3/8/2022  "

## 2022-03-08 NOTE — PROGRESS NOTES
[facility] Rehabilitation Service    Outpatient Physical Therapy Progress Note  Patient: Salomón Edwards  : 1975    Beginning/End Dates of Reporting Period:  2/3/2022 to 3/8/2022    Referring Provider: Dr. Onel Aquino,PR    Therapy Diagnosis: Low Back Pain     Client Self Report: Pt reports that he was feeling pretty good on Friday. Pt states that at rest, he is no longer having the aching, impinging , hurting pain. At rest, ihe is getting some repreive from the pain. He is not having pain with stretching and it feels good. Pt states that he was able to get in cardio twice since last Thursday. He went 20 minutes. Pt states that he has been sleeping on the floor and he feels better and has a better result in the morning. CC is low back pain with any physical activity- sports, housework, yard work. Any bending, twisting, stooping can aggrevate his pain. Even stooping to put on socks.    Objective Measurements:  Objective Measure: General Listening  Details: Mild Left SB  Objective Measure: Lumbar ROM  Details: Flexion: 100, Extension: 30 degrees with decrease hinging at L3 for extension  Objective Measure: Palpation  Details: Organ Specific fascial assessment: + L kidney with extended listening to left psoas;  note decrease bilateral psoas tenderness  Objective Measure: SLR  Details: 90 degrees with decrease dural tension  Objective Measure: Segmental Findings  Details: FRS-R L1, FRS-L T12;  greatly improved lower lumbar and sacral mobility  Objective Measure: Core Strengthening; Circuit Training  Details: Progressing: low abs: level 1-3; low plank 60 sec hold;  high plank: mt climbers, knee to opposit elbow, spiderman's;  Pt has begun [employer] circuit strength training      Outcome Measures (most recent score):  Oswestry: improved from 36% to 32% self disability score    Goals:  Goal Identifier Pain   Goal Description Pt will have  minimal to no pain   Target Date 04/28/22   Date Met      Progress (detail required for progress note):       Goal Identifier Lifting   Goal Description Pt will tolerate lifting 45-50 lbs without increasing pain > 0-2/10   Target Date 04/28/22   Date Met      Progress (detail required for progress note):       Goal Identifier Standing   Goal Description Pt will tolerate standing at work or for yardwork for 45 minutes without increasing pain > 0-2/10   Target Date 04/28/22   Date Met      Progress (detail required for progress note):  (Pt's pain is 4-6/10 with prolong statnding)     Goal Identifier Recreational   Goal Description Pt will tolerate recreational hobbies such as working out, camping, biking, canoeing, fishing without increasing pain > 0-2/10   Target Date 04/28/22   Date Met      Progress (detail required for progress note):  (Pt pregressed to 20 minutes on the bike)     Goal Identifier Driving   Goal Description Pt will tolerate driving 60 minutes without increasing pain > 0-2/10   Target Date 04/28/22   Date Met      Progress (detail required for progress note):  (Pt continues to have pain > 2/10 with driving children>1 hr)     Goal Identifier     Goal Description     Target Date     Date Met      Progress (detail required for progress note):       Goal Identifier     Goal Description     Target Date     Date Met      Progress (detail required for progress note):       Goal Identifier     Goal Description     Target Date     Date Met      Progress (detail required for progress note):             Plan:  Continue therapy per current plan of care.    HEP: Standing trunk flexion, SB, and rotation; cervical rotation, SB, levator scap, and UT stretches; standing quadriceps and lat dorsi stretch, 1/2 kneeling iliopsoas stretch, Supine hamstring and piriformis stretches, cat/cow with thread the needle,  kneeling child's pose, and kneeling QL stretches, prone press ups, prone glut re-training: opp UE/LE lifts,  tall kneeling Jayden Chair,  low abdominal re-training level 1 low march, level 2 low single leg kicks with UE variation, level 3 high march', low plank hold 60 sec, high plank: mt climbers, knee to opp elbow, spiderman's; side plank clams    Discharge:  No- pt is progressing in all areas with improved mobility, flexibility, core strength, and cardio. Pt has been instructed into beginning Pain Neuroscience and Sleep hygiene concepts.

## 2022-03-10 ENCOUNTER — THERAPY VISIT (OUTPATIENT)
Dept: CHIROPRACTIC MEDICINE | Facility: OTHER | Age: 47
End: 2022-03-10
Attending: CHIROPRACTOR
Payer: COMMERCIAL

## 2022-03-10 VITALS
SYSTOLIC BLOOD PRESSURE: 112 MMHG | OXYGEN SATURATION: 97 % | TEMPERATURE: 96.5 F | DIASTOLIC BLOOD PRESSURE: 84 MMHG | RESPIRATION RATE: 16 BRPM | HEART RATE: 68 BPM

## 2022-03-10 DIAGNOSIS — G89.29 CHRONIC BILATERAL LOW BACK PAIN WITHOUT SCIATICA: ICD-10-CM

## 2022-03-10 DIAGNOSIS — M99.05 SEGMENTAL DYSFUNCTION OF PELVIC REGION: ICD-10-CM

## 2022-03-10 DIAGNOSIS — M54.2 CERVICALGIA: ICD-10-CM

## 2022-03-10 DIAGNOSIS — M54.6 PAIN IN THORACIC SPINE: ICD-10-CM

## 2022-03-10 DIAGNOSIS — M99.02 SEGMENTAL AND SOMATIC DYSFUNCTION OF THORACIC REGION: ICD-10-CM

## 2022-03-10 DIAGNOSIS — M99.01 SEGMENTAL AND SOMATIC DYSFUNCTION OF CERVICAL REGION: ICD-10-CM

## 2022-03-10 DIAGNOSIS — M54.50 CHRONIC BILATERAL LOW BACK PAIN WITHOUT SCIATICA: ICD-10-CM

## 2022-03-10 DIAGNOSIS — M47.816 FACET ARTHROPATHY, LUMBAR: ICD-10-CM

## 2022-03-10 DIAGNOSIS — M24.129: Primary | ICD-10-CM

## 2022-03-10 PROCEDURE — 99212 OFFICE O/P EST SF 10 MIN: CPT | Mod: 25 | Performed by: CHIROPRACTOR

## 2022-03-10 PROCEDURE — 98941 CHIROPRACT MANJ 3-4 REGIONS: CPT | Mod: AT | Performed by: CHIROPRACTOR

## 2022-03-10 NOTE — PROGRESS NOTES
Ongoing lower back is frequently dull aching. 4/10 W24 9/10. Using heat, stretches, Tylenol and Ibuprofen. Ibuprofen gives short time relief. Neck is occasional pressure. 3/10 W24 7/10.  Ramila Pereira on 3/10/2022 at 8:45 AM    Visit #:  1/6-8  New episode 3/10/22  Subjective:  Salomón Edwards is a 47 year old male who is seen in f/u up for:        Facet arthropathy, lumbar  Chronic bilateral low back pain without sciatica  Segmental dysfunction of pelvic region  Joint derangement of elbow  Segmental and somatic dysfunction of cervical region  Cervicalgia  Segmental and somatic dysfunction of thoracic region  Pain in thoracic spine.     Since last visit on 12/23/2021,  Salomón Edwards reports:    Referred by Dr. Kenia TREVIZO for treatment of lower back symptoms. Currently in Spine therapy program, slower progress. Wants to push more but usually causes aggravation of symptoms. No radicular symptoms .    Patient was under our care on one occasion for neck and upper back symptoms which he requests treatment for today. After last treatment massage therapist left elbow.  Has been noticing popping sensation primarily when straightening out his elbow on left side.  Most noticeable when doing pressing exercises such as bench press, or tricep extension exercises most notably those overhead.  Patient did have some numbness and tingling following left ulnar nerve but this appears to have improved at this time.  No reported weakness of the upper extremities, no visible papal hand or clawhand sign.    (DVPRS) Pain Rating Score : Sometimes distracts me (W24 7/10) (03/10/22 0843)     Objective:  The following was observed:  /84 (BP Location: Right arm, Patient Position: Sitting)   Pulse 68   Temp (!) 96.5  F (35.8  C) (Tympanic)   Resp 16   SpO2 97%      Neck Disability Index (  Juan José H. and Анна C. 1991. All rights reserved.; used with permission) 3/10/2022   SECTION 1 - PAIN INTENSITY 1   SECTION 2 - PERSONAL CARE 0    SECTION 3 - LIFTING 1   SECTION 4 - READING 2   SECTION 5 - HEADACHES 0   SECTION 6 - CONCENTRATION 0   SECTION 7 - WORK 2   SECTION 8 - DRIVING 1   SECTION 9 - SLEEPING 2   SECTION 10 - RECREATION 2   Count 10   Sum 11   Raw Score: /50 11   Neck Disability Index Score: (%) 22     Cervical AROM: Flexion WNL, extension mild restriction less than 5 degrees, left rotation and lateral flexion restriction noted approximately 5 degrees, right lateral flexion and rotation WNL    Cervical compression: -  Cervical distraction: -    Oswestry (MARISELA) Questionnaire    OSWESTRY DISABILITY INDEX 3/10/2022   Count 10   Sum 19   Oswestry Score (%) 38   Some recent data might be hidden      Thoracic/lumbar AROM: Flexion WNL, extension mild restriction, left lateral flexion restriction 5-10 degrees approximate, right lateral flexion 5 degrees loss approximate with left-sided low back pain    SLR: +right, +left  Ely's: -right, -left    P: palpatory tenderness Left PSIS, CT junction midline, C1/3:    A: static palpation demonstrates intersegmental asymmetry , cervical, thoracic, pelvis  R: motion palpation notes restricted motion, C1 , C3 , T1  and PSIS Left   T: muscle spasm at level(s): left quadratus lumborum, lumbar paraspinals bilaterally, upper trapezius bialterally, mild of the suboccipitals bilaterally:      Segmental spinal dysfunction/restrictions found at:  :  C1 Left rotation restricted and Right lateral flexion restricted  C3 Right rotation restricted, Left lateral flexion restricted and Extension restriction  T1 Extension restriction  PSIS Left PI/EX listing.      Assessment: Patient has been under our care in the past for primarily neck and upper back symptoms.  Referred to our office for low back symptoms.  Was informed by Dr. Kenia MAJANO to assess for PI/EX listing of the left ilium.  This was present on today's visit.  Along with segmental/somatic dysfunction of the cervical and upper thoracic spinal regions.  At this  time plan to follow-up with patient for 6-8 visits within the next 4-6 weeks.  Consider possibility of utilization of acupuncture if progress continues to remain slow.  Physical therapy currently being managed through spine therapy program by Andreea Thompson PT.  All physical therapy concerns will be deferred to her.    Snapping triceps on left, currently ulnar nerve does not appear to be affected.  Patient history does suggest that this is a possibility however.  Encourage patient to continue to treat symptoms conservatively through stretching, soft tissue mobilization, nerve flossing.  If patient begins developing neuropathy following ulnar nerve path patient was instructed to immediately follow-up with orthopedic surgeon for further evaluation and treatment of this.    Diagnoses:      1. Joint derangement of elbow    2. Facet arthropathy, lumbar    3. Chronic bilateral low back pain without sciatica    4. Segmental dysfunction of pelvic region    5. Segmental and somatic dysfunction of cervical region    6. Cervicalgia    7. Segmental and somatic dysfunction of thoracic region    8. Pain in thoracic spine        Patient's condition:  Patient had restrictions pre-manipulation    Treatment effectiveness:  Post manipulation there is better intersegmental movement and Patient claims to feel looser post manipulation      Procedures:  E/M [id number]    CMT:  [id number] Chiropractic manipulative treatment 3-4 regions performed   Cervical: Diversified, C1 , C3 , Supine  Thoracic: Diversified, T1, Prone  Pelvis: Diversified, PSIS Left , Side posture    Modalities:  None performed this visit    Therapeutic procedures:  None  Deferred to physical therapy    Response to Treatment  Reduction in symptoms, was able to squat with less pain    Prognosis: Good    Goals: Reduce low back pain symptoms by 75%  Patient be able to perform therapeutic exercises without painful limitations  Improve spinal AROM  Decrease disability index scores  20-30%    Recommendations:    Instructions:Monitor symptoms and perform activities as tolerated    Follow-up:  Return to care in 5 days.

## 2022-03-11 ENCOUNTER — HOSPITAL ENCOUNTER (OUTPATIENT)
Dept: PHYSICAL THERAPY | Facility: OTHER | Age: 47
Setting detail: THERAPIES SERIES
Discharge: HOME | End: 2022-03-11
Attending: CHIROPRACTOR
Payer: COMMERCIAL

## 2022-03-11 PROCEDURE — 97110 THERAPEUTIC EXERCISES: CPT | Mod: GP

## 2022-03-15 ENCOUNTER — HOSPITAL ENCOUNTER (OUTPATIENT)
Dept: PHYSICAL THERAPY | Facility: OTHER | Age: 47
Setting detail: THERAPIES SERIES
Discharge: HOME | End: 2022-03-15
Attending: CHIROPRACTOR
Payer: COMMERCIAL

## 2022-03-15 PROCEDURE — 97110 THERAPEUTIC EXERCISES: CPT | Mod: GP

## 2022-03-16 ENCOUNTER — THERAPY VISIT (OUTPATIENT)
Dept: CHIROPRACTIC MEDICINE | Facility: OTHER | Age: 47
End: 2022-03-16
Attending: CHIROPRACTOR
Payer: COMMERCIAL

## 2022-03-16 VITALS
HEART RATE: 72 BPM | DIASTOLIC BLOOD PRESSURE: 88 MMHG | OXYGEN SATURATION: 97 % | TEMPERATURE: 96.9 F | SYSTOLIC BLOOD PRESSURE: 124 MMHG | RESPIRATION RATE: 16 BRPM

## 2022-03-16 DIAGNOSIS — M47.816 FACET ARTHROPATHY, LUMBAR: ICD-10-CM

## 2022-03-16 DIAGNOSIS — M54.50 CHRONIC BILATERAL LOW BACK PAIN WITHOUT SCIATICA: ICD-10-CM

## 2022-03-16 DIAGNOSIS — G89.29 CHRONIC BILATERAL LOW BACK PAIN WITHOUT SCIATICA: ICD-10-CM

## 2022-03-16 DIAGNOSIS — M99.05 SEGMENTAL AND SOMATIC DYSFUNCTION OF PELVIC REGION: Primary | ICD-10-CM

## 2022-03-16 PROCEDURE — 98940 CHIROPRACT MANJ 1-2 REGIONS: CPT | Mod: AT | Performed by: CHIROPRACTOR

## 2022-03-18 ENCOUNTER — HOSPITAL ENCOUNTER (OUTPATIENT)
Dept: PHYSICAL THERAPY | Facility: OTHER | Age: 47
Setting detail: THERAPIES SERIES
Discharge: HOME | End: 2022-03-18
Attending: CHIROPRACTOR
Payer: COMMERCIAL

## 2022-03-18 PROCEDURE — 97110 THERAPEUTIC EXERCISES: CPT | Mod: GP

## 2022-03-22 ENCOUNTER — HOSPITAL ENCOUNTER (OUTPATIENT)
Dept: PHYSICAL THERAPY | Facility: OTHER | Age: 47
Setting detail: THERAPIES SERIES
Discharge: HOME | End: 2022-03-22
Attending: CHIROPRACTOR
Payer: COMMERCIAL

## 2022-03-22 PROCEDURE — 97110 THERAPEUTIC EXERCISES: CPT | Mod: GP

## 2022-03-25 ENCOUNTER — HOSPITAL ENCOUNTER (OUTPATIENT)
Dept: PHYSICAL THERAPY | Facility: OTHER | Age: 47
Setting detail: THERAPIES SERIES
Discharge: HOME | End: 2022-03-25
Attending: CHIROPRACTOR
Payer: COMMERCIAL

## 2022-03-25 PROCEDURE — 97110 THERAPEUTIC EXERCISES: CPT | Mod: GP

## 2022-03-29 ENCOUNTER — HOSPITAL ENCOUNTER (OUTPATIENT)
Dept: PHYSICAL THERAPY | Facility: OTHER | Age: 47
Setting detail: THERAPIES SERIES
Discharge: HOME | End: 2022-03-29
Attending: CHIROPRACTOR
Payer: COMMERCIAL

## 2022-03-29 ENCOUNTER — THERAPY VISIT (OUTPATIENT)
Dept: CHIROPRACTIC MEDICINE | Facility: OTHER | Age: 47
End: 2022-03-29
Attending: CHIROPRACTOR
Payer: COMMERCIAL

## 2022-03-29 VITALS
SYSTOLIC BLOOD PRESSURE: 124 MMHG | OXYGEN SATURATION: 98 % | RESPIRATION RATE: 16 BRPM | TEMPERATURE: 97.7 F | HEART RATE: 73 BPM | DIASTOLIC BLOOD PRESSURE: 88 MMHG

## 2022-03-29 DIAGNOSIS — M62.838 SPASM OF MUSCLE: ICD-10-CM

## 2022-03-29 DIAGNOSIS — M47.816 FACET ARTHROPATHY, LUMBAR: ICD-10-CM

## 2022-03-29 DIAGNOSIS — M99.04 SEGMENTAL AND SOMATIC DYSFUNCTION OF SACRAL REGION: Primary | ICD-10-CM

## 2022-03-29 DIAGNOSIS — M99.01 SEGMENTAL AND SOMATIC DYSFUNCTION OF CERVICAL REGION: ICD-10-CM

## 2022-03-29 DIAGNOSIS — M54.50 LEFT LOW BACK PAIN, UNSPECIFIED CHRONICITY, UNSPECIFIED WHETHER SCIATICA PRESENT: ICD-10-CM

## 2022-03-29 DIAGNOSIS — M99.02 SEGMENTAL AND SOMATIC DYSFUNCTION OF THORACIC REGION: ICD-10-CM

## 2022-03-29 PROCEDURE — 97140 MANUAL THERAPY 1/> REGIONS: CPT | Mod: GP

## 2022-03-29 PROCEDURE — 97110 THERAPEUTIC EXERCISES: CPT | Mod: GP

## 2022-03-29 PROCEDURE — 98941 CHIROPRACT MANJ 3-4 REGIONS: CPT | Mod: AT | Performed by: CHIROPRACTOR

## 2022-03-29 NOTE — PROGRESS NOTES
[facility] Rehabilitation Service    Outpatient Physical Therapy Progress Note  Patient: Salomón Edwards  : 1975    Beginning/End Dates of Reporting Period:  2/3/23492 to 3/29/2022    Referring Provider: Dr. Onel Aquino DC    Therapy Diagnosis: Low Back Pain     Client Self Report: Pt reports that he slept through the night for 7 hours without medication. At rest, he has less pain. Pt is working out 4 x/week. He is alternating between sitting and standing at work. Pt states that he still has difficulty with bending over picking things off the floor. He feels stiff in the moring. He has a general sense of weakness that he cannot control.  He has no pain in his buttocks or legs.    Objective Measurements:  Objective Measure: General Listening  Details: R SB  Objective Measure: Lumbar ROM  Details: Flexion: 110, Extension 30 degrees with less hinging and improved mobility inot lower lumbar spine  Objective Measure: Local Listening/Palpation  Details: Mild + RRight kidney with extended listening to right psoas;  Mild tenderness left QL and right psoas  Objective Measure: SLR  Details: 90 degrees with decrease dural tension  Objective Measure: Segmental Findings  Details: FRS-L L2  Objective Measure: Core Strengthening; Circuit Training  Details: Emphasizing glut re-training exercises, Progressing: low abs: level 1-3; low plank 60 sec hold; low plank glut activation with hip extension ;  Pt has continues with [employer] circuit strength training and is able to increase weights.      Outcome Measures (most recent score):  Oswestry: improved from 36% to 28% self disability score    Goals:  Goal Identifier Pain   Goal Description Pt will have minimal to no pain   Target Date 22   Date Met  22   Progress (detail required for progress note):  (Pt has been having minimal pain, unless it is heavy lifting)     Goal Identifier Lifting    Goal Description Pt will tolerate lifting 45-50 lbs without increasing pain > 0-2/10   Target Date 04/28/22   Date Met  03/29/22   Progress (detail required for progress note):  (pt able to lift using legs,but, not bending over)     Goal Identifier Standing   Goal Description Pt will tolerate standing at work or for yardwork for 45 minutes without increasing pain > 0-2/10   Target Date 04/28/22   Date Met      Progress (detail required for progress note):  (Pt's pain is 4-6/10 with prolong statnding)     Goal Identifier Recreational   Goal Description Pt will tolerate recreational hobbies such as working out, camping, biking, canoeing, fishing without increasing pain > 0-2/10   Target Date 04/28/22   Date Met      Progress (detail required for progress note):  (Pt pregressed to 20 minutes on the bike, gym 4x/wk)     Goal Identifier Driving   Goal Description Pt will tolerate driving 60 minutes without increasing pain > 0-2/10   Target Date 04/28/22   Date Met      Progress (detail required for progress note):  (Pt continues to have pain > 2/10 with driving children>1 hr)     Goal Identifier     Goal Description     Target Date     Date Met      Progress (detail required for progress note):       Goal Identifier     Goal Description     Target Date     Date Met      Progress (detail required for progress note):       Goal Identifier     Goal Description     Target Date     Date Met      Progress (detail required for progress note):             Plan:  Pt to re-check in 1 month for follow up    Discharge:  No    Home Exercise Program:    Standing trunk flexion, SB, and rotation; cervical rotation, SB, levator scap, and UT stretches; standing quadriceps and lat dorsi stretch, 1/2 kneeling iliopsoas stretch, Supine hamstring and piriformis stretches, cat/cow with thread the needle,  kneeling child's pose, and kneeling QL stretches, prone press ups, prone glut re-training: opp UE/LE lifts, tall kneeling Jayden Chair,   low abdominal re-training level 1 low march, level 2 low single leg kicks with UE variation, level 3 high march', low plank hold 60 sec, high plank: mt climbers, knee to opp elbow, spiderman's; side plank clams  Glut Program: Low plank glut activation;hip extension,  Glut Program: Prone single and double leg lifts, hip thrusts, Chinese Split Squats, Single leg squat, Single leg Setswana dead lifts, single leg glut bridge, 4 point straight leg kicks, Side plank leg abduction,

## 2022-03-29 NOTE — PROGRESS NOTES
Left lower back and hip is frequent sharp. 5/10 w24 8/10. Using heat, stretches, and Aleve. These provide a decrease in pain.  Ramila Pereira on 3/29/2022 at 11:37 AM    Reviewed by EW    Visit #:  3    Subjective:  Salomón Edwards is a 47 year old male who is seen in f/u up for:        Segmental and somatic dysfunction of sacral region  Facet arthropathy, lumbar  Left low back pain, unspecified chronicity, unspecified whether sciatica present  Segmental and somatic dysfunction of thoracic region  Segmental and somatic dysfunction of cervical region  Spasm of muscle.     Since last visit on 3/16/2022,  Salomón Edwards reports: symptoms did well following past treatment.   Recently traveled to [facility] and began to have a flare up of lower back symptoms on the left side. Continuing with physical therapy which has been helping the patient.    Will be traveling to [place] and concerned of symptoms becoming aggravated with travel.     Found some back spasms in the mid to lower thoracic region. Requests evaluation of the cervical spine as well due to upcoming travel.       (DVPRS) Pain Rating Score : Interrupts some activities (W24 8/10) (03/29/22 1131)     Objective:  The following was observed:  /88 (BP Location: Right arm, Patient Position: Sitting, Cuff Size: Adult Regular)   Pulse 73   Temp 97.7  F (36.5  C) (Tympanic)   Resp 16   SpO2 98%      P: palpatory tenderness Left PSIS, T8 midline, right side C1:    A: static palpation demonstrates intersegmental asymmetry , cervical, thoracic, pelvis  R: motion palpation notes restricted motion, C1 , T8  and Sacrum   T: muscle spasm at level(s): Mild blunting of latissimus dorsi muscles and T-spine paraspinals subscapular T-spine bilaterally, taut tender fibers noted right suboccipital musculature, left quadratus lumborum:      Segmental spinal dysfunction/restrictions found at:  :  C1 Left rotation restricted and Right lateral flexion restricted  T8 Extension  restriction  Sacrum Left lateral flexion restricted and Extension restriction.      Assessment: Mild flareup of ongoing back pain.  Segmental/somatic dysfunction present of the cervical, thoracic, and pelvic regions consistent with patient's symptoms.  Plan to follow-up with patient upon return from upcoming trip.    Diagnoses:      1. Segmental and somatic dysfunction of sacral region    2. Facet arthropathy, lumbar    3. Left low back pain, unspecified chronicity, unspecified whether sciatica present    4. Segmental and somatic dysfunction of thoracic region    5. Segmental and somatic dysfunction of cervical region    6. Spasm of muscle        Patient's condition:  Patient had restrictions pre-manipulation    Treatment effectiveness:  Post manipulation there is better intersegmental movement and Patient claims to feel looser post manipulation      Procedures:  CMT:  04985 Chiropractic manipulative treatment 3-4 regions performed   Cervical: Diversified, C1 , Supine  Thoracic: Diversified, T8, Prone  Pelvis: Diversified, Sacrum , Side posture    Modalities:  None performed this visit    Therapeutic procedures:  None  Deferred to physical therapy    Response to Treatment  Reduction in symptoms as reported by patient    Prognosis: Good    Progress towards Goals: Patient is making progress towards the goal.   Reduce low back pain symptoms by 75%  Patient be able to perform therapeutic exercises without painful limitations  Improve spinal AROM  Decrease disability index scores 20-30%    Recommendations:    Instructions:none    Follow-up:  Continue treatment PRN.

## 2022-05-03 ENCOUNTER — OFFICE VISIT (OUTPATIENT)
Dept: FAMILY MEDICINE | Facility: OTHER | Age: 47
End: 2022-05-03
Attending: NURSE PRACTITIONER
Payer: COMMERCIAL

## 2022-05-03 VITALS
OXYGEN SATURATION: 96 % | RESPIRATION RATE: 16 BRPM | BODY MASS INDEX: 25.56 KG/M2 | HEART RATE: 68 BPM | WEIGHT: 172.6 LBS | HEIGHT: 69 IN | DIASTOLIC BLOOD PRESSURE: 88 MMHG | TEMPERATURE: 98.4 F | SYSTOLIC BLOOD PRESSURE: 126 MMHG

## 2022-05-03 DIAGNOSIS — J40 BRONCHITIS: Primary | ICD-10-CM

## 2022-05-03 PROCEDURE — 99213 OFFICE O/P EST LOW 20 MIN: CPT | Performed by: NURSE PRACTITIONER

## 2022-05-03 RX ORDER — AZITHROMYCIN 250 MG/1
TABLET, FILM COATED ORAL
Qty: 6 TABLET | Refills: 0 | Status: SHIPPED | OUTPATIENT
Start: 2022-05-03 | End: 2022-05-08

## 2022-05-03 RX ORDER — ALBUTEROL SULFATE 90 UG/1
2 AEROSOL, METERED RESPIRATORY (INHALATION) EVERY 6 HOURS
Qty: 18 G | Refills: 0 | Status: SHIPPED | OUTPATIENT
Start: 2022-05-03 | End: 2023-02-23

## 2022-05-03 ASSESSMENT — ENCOUNTER SYMPTOMS: COUGH: 1

## 2022-05-03 ASSESSMENT — PAIN SCALES - GENERAL: PAINLEVEL: NO PAIN (0)

## 2022-05-03 NOTE — PROGRESS NOTES
"  Assessment & Plan   Problem List Items Addressed This Visit    None     Visit Diagnoses     Bronchitis    -  Primary    Relevant Medications    azithromycin (ZITHROMAX) 250 MG tablet    albuterol (PROAIR HFA/PROVENTIL HFA/VENTOLIN HFA) 108 (90 Base) MCG/ACT inhaler      Been present for 3 to 4 weeks, does not seem to be improving.  No other systemic infections at this time.  Opted to treat with azithromycin and albuterol inhaler.  Suspect this will resolve current symptoms but if he persist, follow-up in clinic.    No follow-ups on file.    JOE Davenport [facility]    Subjective   Salomón is a 47 year old who presents for the following health issues     Cough    History of Present Illness       Reason for visit:  Cough  Symptom onset:  3-4 weeks ago    He eats 2-3 servings of fruits and vegetables daily.He consumes 0 sweetened beverage(s) daily.He exercises with enough effort to increase his heart rate 30 to 60 minutes per day.  He exercises with enough effort to increase his heart rate 5 days per week.   He is taking medications regularly.     Cough for the past 3 weeks. Nonproductive, chest tightness. Some SOB. Worse symptoms with physical activity. Started with mild cold sx, sinus congestion. Had been on plane from [place], COVID test negative. No fevers. Having some fatigue. Tried some OTC medications.     Review of Systems   Respiratory: Positive for cough.    See above      Objective    /88   Pulse 68   Temp 98.4  F (36.9  C)   Resp 16   Ht 1.753 m (5' 9\")   Wt 78.3 kg (172 lb 9.6 oz)   SpO2 96%   BMI 25.49 kg/m    Body mass index is 25.49 kg/m .  Physical Exam   GENERAL: healthy, alert and no distress  EYES: Eyes grossly normal to inspection, PERRL and conjunctivae and sclerae normal  HENT: ear canals and TM's normal, nose and mouth without ulcers or lesions  NECK: no adenopathy, no asymmetry, masses, or scars and thyroid normal to palpation  RESP: lungs clear to " auscultation - no rales, rhonchi or wheezes, O2 sats 96% on room air, frequent dry cough during exam  CV: regular rate and rhythm, normal S1 S2, no S3 or S4  PSYCH: mentation appears normal, affect normal/bright

## 2022-05-03 NOTE — NURSING NOTE
Patient presents today for cough that started 3 weeks ago. Patient complains of fatigue and shortness of breath.    Medication Reconciliation Complete    Nurys Reon LPN  5/3/2022 1:30 PM

## 2022-06-22 ENCOUNTER — THERAPY VISIT (OUTPATIENT)
Dept: CHIROPRACTIC MEDICINE | Facility: OTHER | Age: 47
End: 2022-06-22
Attending: CHIROPRACTOR
Payer: COMMERCIAL

## 2022-06-22 VITALS
TEMPERATURE: 98.2 F | SYSTOLIC BLOOD PRESSURE: 122 MMHG | OXYGEN SATURATION: 97 % | DIASTOLIC BLOOD PRESSURE: 78 MMHG | RESPIRATION RATE: 18 BRPM | HEART RATE: 85 BPM

## 2022-06-22 DIAGNOSIS — M54.50 ACUTE RIGHT-SIDED LOW BACK PAIN, UNSPECIFIED WHETHER SCIATICA PRESENT: ICD-10-CM

## 2022-06-22 DIAGNOSIS — M54.6 PAIN IN THORACIC SPINE: ICD-10-CM

## 2022-06-22 DIAGNOSIS — M99.01 SEGMENTAL AND SOMATIC DYSFUNCTION OF CERVICAL REGION: ICD-10-CM

## 2022-06-22 DIAGNOSIS — M47.816 FACET ARTHROPATHY, LUMBAR: ICD-10-CM

## 2022-06-22 DIAGNOSIS — M99.05 SEGMENTAL AND SOMATIC DYSFUNCTION OF PELVIC REGION: Primary | ICD-10-CM

## 2022-06-22 DIAGNOSIS — M99.02 SEGMENTAL AND SOMATIC DYSFUNCTION OF THORACIC REGION: ICD-10-CM

## 2022-06-22 PROCEDURE — 99212 OFFICE O/P EST SF 10 MIN: CPT | Mod: 25 | Performed by: CHIROPRACTOR

## 2022-06-22 PROCEDURE — 98941 CHIROPRACT MANJ 3-4 REGIONS: CPT | Mod: AT | Performed by: CHIROPRACTOR

## 2022-06-22 NOTE — PROGRESS NOTES
"Started at 10am today working on [employer] wheel. Right lower back and right hip is constant pressure feeling like hip is rotating outward. Rates 8/10 W24 8/10. Using tylenol with no change in pain.  Neck is constant tender. Rates 6/10 W24 8/10. Using tylenol it somewhat helps.  Dane Salgado on 6/22/2022 at 2:11 PM    Review by EW    Visit #:  4 PRN  RE-assessment    Subjective:  Salomón Edwards is a 47 year old male who is seen in f/u up for:        Segmental and somatic dysfunction of pelvic region  Facet arthropathy, lumbar  Acute right-sided low back pain, unspecified whether sciatica present  Segmental and somatic dysfunction of thoracic region  Pain in thoracic spine  Segmental and somatic dysfunction of cervical region.     Since last visit on 3/29/2022,  Salomón Edwards reports: Patient presents with complaints of right-sided low back/hip pain which began earlier today.  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  Earlier today he was sitting at his pottery wheel when he felt something \"go out.\"  Denies any traumatic events prior to this.  No radicular complaints, loss of bowel or bladder or saddle paresthesia present at this time.  Has been doing his home exercises and stretching from spine therapy program.  This does seem to help however due to acute onset of symptoms and prior history of similar symptoms patient knew that this would most likely not respond to home care measures and presented to our office for evaluation and treatment.    Some neck and upper back pain symptoms also present on today's encounter and patient does request evaluation and treatment of this spinal region if necessary.      (DVPRS) Pain Rating Score : Hard to ignore, avoid usual activities (W24 8/10) (06/22/22 1411)     Objective:  The following was observed:  /78 (BP Location: Right arm, Patient Position: Sitting, Cuff Size: Adult Regular)   Pulse 85 "   Temp 98.2  F (36.8  C) (Tympanic)   Resp 18   SpO2 97%    Neck Disability Index (  Juan José MEYER. and Анна ANNA. 1991. All rights reserved.; used with permission) 6/22/2022   SECTION 1 - PAIN INTENSITY 2   SECTION 2 - PERSONAL CARE 0   SECTION 3 - LIFTING 0   SECTION 4 - READING 1   SECTION 5 - HEADACHES 0   SECTION 6 - CONCENTRATION 0   SECTION 7 - WORK 1   SECTION 8 - DRIVING 1   SECTION 9 - SLEEPING 1   SECTION 10 - RECREATION 0   Count 10   Sum 6   Raw Score: /50 6   Neck Disability Index Score: (%) 12     Cervical AROM: Generally unremarkable  Cervical compression:-  Cervical distraction:-    Oswestry (MARISELA) Questionnaire    OSWESTRY DISABILITY INDEX 6/22/2022   Count 9   Sum 27   Oswestry Score (%) 60   Some recent data might be hidden      Thoracic/lumbar AROM: Flexion WNL, extension restriction 5 degrees approximate, right lateral flexion restriction 15 degrees approximate, left lateral flexion restriction less than 5 degrees    Elys: +right, -left  [place]: SELAM, TARIK    P: palpatory tendernessC2 right, C4 left, T3 bilateral, T9 bilateral, right PSIS:    A: static palpation demonstrates intersegmental asymmetry , cervical, thoracic, pelvis  R: motion palpation notes restricted motion, C2 , C4 , T3 , T9  and PSIS Right   T: muscle spasm at level(s): Right quadratus lumborum, right gluteus medius, upper trapezius and cervical paraspinal musculature bilaterally, hypertonic T-spine paraspinals bilaterally:      Segmental spinal dysfunction/restrictions found at:  :  C2 Right lateral flexion restricted and Extension restriction  C4 Left lateral flexion restricted and Extension restriction  T3 Extension restriction  T9 Extension restriction  PSIS Right PI listing.      Assessment: SI joint dysfunction present consistent with patient's symptoms.  Segmental/somatic dysfunction also present of the thoracic and cervical spine.  Patient has been under our care in the past with beneficial results and anticipate that to be  the case at this time.  Continue to follow-up with patient as needed for flareup care.  Patient has gone through spine therapy program and has home exercises.  Believe this should be adequate in helping to manage symptoms, only requiring occasional chiropractic adjustments when home exercises are not quite enough to help manage symptoms.    Patient may still benefit from additional visit within next couple of weeks.    Diagnoses:      1. Segmental and somatic dysfunction of pelvic region    2. Facet arthropathy, lumbar    3. Acute right-sided low back pain, unspecified whether sciatica present    4. Segmental and somatic dysfunction of thoracic region    5. Pain in thoracic spine    6. Segmental and somatic dysfunction of cervical region        Patient's condition:  Patient had restrictions pre-manipulation    Treatment effectiveness:  Post manipulation there is better intersegmental movement and Patient claims to feel looser post manipulation      Procedures:  E/M [id number]    CMT:  20946 Chiropractic manipulative treatment 3-4 regions performed   Cervical: Diversified, C2, C4, Supine  Thoracic: Diversified, T3, T9, Prone  Pelvis: Diversified, PSIS Right , Side posture    Modalities:  None performed this visit    Therapeutic procedures:  None    Response to Treatment  Reduction in symptoms as reported by patient  Right lateral flexion improved 5-10 degrees approximate.  Patient still has some residual stiffness.    Prognosis: Good    Progress towards Goals: acute aggravation 6/22/22  Reduce low back pain symptoms by 75%  Patient be able to perform therapeutic exercises without painful limitations  Improve spinal AROM  Decrease disability index scores 20-30%  Recommendations:    Instructions:ice 20 minutes every other hour as needed and heat 15 minutes every other hour as needed    Follow-up:  Continue treatment PRN.

## 2022-06-28 ENCOUNTER — THERAPY VISIT (OUTPATIENT)
Dept: CHIROPRACTIC MEDICINE | Facility: OTHER | Age: 47
End: 2022-06-28
Attending: CHIROPRACTOR
Payer: COMMERCIAL

## 2022-06-28 VITALS
OXYGEN SATURATION: 98 % | DIASTOLIC BLOOD PRESSURE: 80 MMHG | HEART RATE: 71 BPM | TEMPERATURE: 96.9 F | SYSTOLIC BLOOD PRESSURE: 118 MMHG | RESPIRATION RATE: 16 BRPM

## 2022-06-28 DIAGNOSIS — M54.50 ACUTE RIGHT-SIDED LOW BACK PAIN, UNSPECIFIED WHETHER SCIATICA PRESENT: ICD-10-CM

## 2022-06-28 DIAGNOSIS — M99.05 SEGMENTAL AND SOMATIC DYSFUNCTION OF PELVIC REGION: Primary | ICD-10-CM

## 2022-06-28 DIAGNOSIS — M47.816 FACET ARTHROPATHY, LUMBAR: ICD-10-CM

## 2022-06-28 PROCEDURE — 98940 CHIROPRACT MANJ 1-2 REGIONS: CPT | Mod: AT | Performed by: CHIROPRACTOR

## 2022-06-28 NOTE — PATIENT INSTRUCTIONS
ice 20 minutes every other hour as needed, heat 15 minutes every other hour as needed and Continue with home exercises from physical therapy

## 2022-06-28 NOTE — PROGRESS NOTES
Lower right back with constant sharpness. radiating down into the right hip. 10/10 W24 10/10 Using heat and stretches with some decrease.   Urmila Velásquez    Reviewed by EW    Visit #:  5 PRN    Subjective:  Salomón Edwards is a 47 year old male who is seen in f/u up for:        Segmental and somatic dysfunction of pelvic region  Facet arthropathy, lumbar  Acute right-sided low back pain, unspecified whether sciatica present.     Since last visit on 6/22/2022,  Salomón Edwards reports: did well post treatment, neck and upper back 90% improved. Right hip/lower back. home remodeling projects believed to be contributing to continuation of this symptom.  No reported radicular complaints.       (DVPRS) Pain Rating Score : Awful, hard to do anything (W24 9/10) (06/28/22 0801)     Objective:  The following was observed:  /80 (BP Location: Right arm, Patient Position: Sitting, Cuff Size: Adult Regular)   Pulse 71   Temp 96.9  F (36.1  C) (Tympanic)   Resp 16   SpO2 98%      P: palpatory tenderness Mild right PSIS:    A: static palpation demonstrates intersegmental asymmetry , pelvis  R: motion palpation notes restricted motion, PSIS Right   T: Spasms noted lumbar paraspinals bilaterally, right quadratus lumborum    Segmental spinal dysfunction/restrictions found at:  :  PSIS Right PI listing.      Assessment: Reactivation of right SI joint dysfunction/acute right-sided low back pain without sciatica.  Encourage patient to continue to perform home exercises from spine therapy program and to monitor symptoms closely.  Patient may still benefit from 1-2 additional visits in the next couple of weeks.    Diagnoses:      1. Segmental and somatic dysfunction of pelvic region    2. Facet arthropathy, lumbar    3. Acute right-sided low back pain, unspecified whether sciatica present        Patient's condition:  Patient had restrictions pre-manipulation    Treatment effectiveness:  Post manipulation there is better  intersegmental movement and Patient claims to feel looser post manipulation      Procedures:  CMT:  32461 Chiropractic manipulative treatment 1-2 regions performed   Pelvis: Diversified, PSIS Right , Side posture    Modalities:  None performed this visit    Therapeutic procedures:  None    Response to Treatment  Reduction in symptoms as reported by patient    Prognosis: Good    Progress towards Goals: Patient is making progress towards the goal.     Recommendations:    Instructions:ice 20 minutes every other hour as needed, heat 15 minutes every other hour as needed and Continue with home exercises from physical therapy    Follow-up:  Continue treatment PRN.

## 2022-07-13 NOTE — PROGRESS NOTES
[facility]    Outpatient Physical Therapy Discharge Note  Patient: Salomón Edwards  : 1975    Beginning/End Dates of Reporting Period:  2/3/2022/ to 3/29/2022    Referring Provider: Onel Aquino DC     Therapy Diagnosis: Low back pain with R buttock and leg pain     Client Self Report: Pt reports that he slept through the night for 7 hours without medication. At rest, he has less pain. Pt is working out 4 x/week. He is alternating between sitting and standing at work. Pt states that he still has difficulty with bending over picking things off the floor. He feels stiff in the moring. He has a general sense of weakness that he cannot control.  He has no pain in his buttocks or legs.    Objective Measurements:  Objective Measure: General Listening  Details: R SB  Objective Measure: Lumbar ROM  Details: Flexion: 110, Extension 30 degrees with less hinging and improved mobility inot lower lumbar spine  Objective Measure: Local Listening/Palpation  Details: Mild + RRight kidney with extended listening to right psoas;  Mild tenderness left QL and right psoas  Objective Measure: SLR  Details: 90 degrees with decrease dural tension  Objective Measure: Segmental Findings  Details: FRS-L L2  Objective Measure: Core Strengthening; Circuit Training  Details: Emphasizing glut re-training exercises, Progressing: low abs: level 1-3; low plank 60 sec hold; low plank glut activation with hip extension ;  Pt has continues with [employer] circuit strength training and is able to increase weights.      Outcome Measures (most recent score):  Improved Oswestry from 36% to 28% self disability score    Goals:  Goal Identifier Pain   Goal Description Pt will have minimal to no pain   Target Date 22   Date Met  22   Progress (detail required for progress note):  (Pt has been having minimal pain, unless it is heavy lifting)      Goal Identifier Lifting   Goal Description Pt will tolerate lifting 45-50 lbs without increasing pain > 0-2/10   Target Date 04/28/22   Date Met  03/29/22   Progress (detail required for progress note):  (pt able to lift using legs,but, not bending over)     Goal Identifier Standing   Goal Description Pt will tolerate standing at work or for yardwork for 45 minutes without increasing pain > 0-2/10   Target Date 04/28/22   Date Met      Progress (detail required for progress note):  (Pt's pain is 4-6/10 with prolong statnding)     Goal Identifier Recreational   Goal Description Pt will tolerate recreational hobbies such as working out, camping, biking, canoeing, fishing without increasing pain > 0-2/10   Target Date 04/28/22   Date Met      Progress (detail required for progress note):  (Pt pregressed to 20 minutes on the bike, gym 4x/wk)     Goal Identifier Driving   Goal Description Pt will tolerate driving 60 minutes without increasing pain > 0-2/10   Target Date 04/28/22   Date Met      Progress (detail required for progress note):  (Pt continues to have pain > 2/10 with driving children>1 hr)     Goal Identifier     Goal Description     Target Date     Date Met      Progress (detail required for progress note):       Goal Identifier     Goal Description     Target Date     Date Met      Progress (detail required for progress note):       Goal Identifier     Goal Description     Target Date     Date Met      Progress (detail required for progress note):             Plan:  Discharge from therapy.    Discharge:Yes    Reason for Discharge: Pt has completed final Spine Program appointment.    Discharge Plan: Patient to continue home program.

## 2022-08-09 ENCOUNTER — OFFICE VISIT (OUTPATIENT)
Dept: FAMILY MEDICINE | Facility: OTHER | Age: 47
End: 2022-08-09
Attending: NURSE PRACTITIONER
Payer: COMMERCIAL

## 2022-08-09 VITALS
DIASTOLIC BLOOD PRESSURE: 80 MMHG | SYSTOLIC BLOOD PRESSURE: 139 MMHG | WEIGHT: 170 LBS | BODY MASS INDEX: 25.1 KG/M2 | RESPIRATION RATE: 20 BRPM | OXYGEN SATURATION: 98 % | TEMPERATURE: 98.9 F | HEART RATE: 82 BPM

## 2022-08-09 DIAGNOSIS — R39.89 SUSPECTED UTI: ICD-10-CM

## 2022-08-09 DIAGNOSIS — R30.0 DYSURIA: Primary | ICD-10-CM

## 2022-08-09 LAB
ALBUMIN UR-MCNC: NEGATIVE MG/DL
APPEARANCE UR: CLEAR
BILIRUB UR QL STRIP: NEGATIVE
COLOR UR AUTO: YELLOW
GLUCOSE UR STRIP-MCNC: NEGATIVE MG/DL
HGB UR QL STRIP: NEGATIVE
KETONES UR STRIP-MCNC: NEGATIVE MG/DL
LEUKOCYTE ESTERASE UR QL STRIP: NEGATIVE
MUCOUS THREADS #/AREA URNS LPF: PRESENT /LPF
NITRATE UR QL: NEGATIVE
PH UR STRIP: 5 [PH] (ref 5–9)
RBC URINE: 0 /HPF
SP GR UR STRIP: 1.02 (ref 1–1.03)
UROBILINOGEN UR STRIP-MCNC: NORMAL MG/DL
WBC URINE: <1 /HPF

## 2022-08-09 PROCEDURE — 87086 URINE CULTURE/COLONY COUNT: CPT | Mod: ZL | Performed by: NURSE PRACTITIONER

## 2022-08-09 PROCEDURE — 81001 URINALYSIS AUTO W/SCOPE: CPT | Mod: ZL | Performed by: NURSE PRACTITIONER

## 2022-08-09 PROCEDURE — 99213 OFFICE O/P EST LOW 20 MIN: CPT | Performed by: NURSE PRACTITIONER

## 2022-08-09 RX ORDER — CEPHALEXIN 500 MG/1
500 CAPSULE ORAL 2 TIMES DAILY
Qty: 14 CAPSULE | Refills: 0 | Status: SHIPPED | OUTPATIENT
Start: 2022-08-09 | End: 2022-08-16

## 2022-08-09 RX ORDER — PHENAZOPYRIDINE HYDROCHLORIDE 200 MG/1
200 TABLET, FILM COATED ORAL 3 TIMES DAILY PRN
Qty: 30 TABLET | Refills: 0 | Status: SHIPPED | OUTPATIENT
Start: 2022-08-09 | End: 2023-02-23

## 2022-08-09 ASSESSMENT — PAIN SCALES - GENERAL: PAINLEVEL: NO PAIN (0)

## 2022-08-09 NOTE — PROGRESS NOTES
ASSESSMENT/PLAN:     I have reviewed the nursing notes.  I have reviewed the findings, diagnosis, plan and need for follow up with the patient.      1. Dysuria    - UA reflex to Microscopic and Culture  - Urine Culture Aerobic Bacterial    - phenazopyridine (PYRIDIUM) 200 MG tablet; Take 1 tablet (200 mg) by mouth 3 times daily as needed for irritation  Dispense: 30 tablet; Refill: 0    2. Suspected UTI    - cephALEXin (KEFLEX) 500 MG capsule; Take 1 capsule (500 mg) by mouth 2 times daily for 7 days  Dispense: 14 capsule; Refill: 0    Urinalysis -yellow, clear, negative nitrite, negative blood, negative leukocyte Estrace  Urine microscopic -RBC 0, WBC 1, no noted bacteria    Urine culture pending  Will call if culture warrants change of abx.     Encouraged fluids and frequent bladder emptying.    Discussed warning signs/symptoms indicative of need to f/u  Follow up if symptoms persist or worsen or concerns      I explained my diagnostic considerations and recommendations to the patient, who voiced understanding and agreement with the treatment plan. All questions were answered. We discussed potential side effects of any prescribed or recommended therapies, as well as expectations for response to treatments.    Alice Chand NP  [facility]      SUBJECTIVE:   Salomón Edwards is a 47 year old male who presents to clinic today for the following health issues:  Possible UTI    HPI  Dysuria for the past week with persisting pain and burning after urination.  No noted hematuria.    No penile discharge.  No scrotal swelling or pain.  No STD concerns.  No fevers or chills.  No nausea or vomiting.  No change in appetite. No abdominal pain or pressure or cramping.  Chronic lower back pain causing constipation.     States hx of UTI about a year ago while in [place].  Tried some of his son's acne antibiotic twice daily for the past 2 days without relief.      Past Medical History:   Diagnosis Date      Chronic sprain or strain of lumbar region     Overview:  hx of flares     Closed fracture of talus     6/15/04,Question of     Pneumonia     05     Past Surgical History:   Procedure Laterality Date     EXTRACTION(S) DENTAL       HERNIA REPAIR, INGUINAL RT/LT Left      Social History     Tobacco Use     Smoking status: Former Smoker     Quit date: 2015     Years since quittin.6     Smokeless tobacco: Never Used   Substance Use Topics     Alcohol use: Yes     Comment: 3 drinks per night     Current Outpatient Medications   Medication Sig Dispense Refill     albuterol (PROAIR HFA/PROVENTIL HFA/VENTOLIN HFA) 108 (90 Base) MCG/ACT inhaler Inhale 2 puffs into the lungs every 6 hours 18 g 0     buPROPion (WELLBUTRIN XL) 300 MG 24 hr tablet Take 300 mg by mouth daily       Allergies   Allergen Reactions     Latex Other (See Comments)     Head congestion, sinus concerns, rash if there is contact with skin         Past medical history, past surgical history, current medications and allergies reviewed and accurate to the best of my knowledge.        OBJECTIVE:     /80 (BP Location: Left arm, Patient Position: Sitting, Cuff Size: Adult Large)   Pulse 82   Temp 98.9  F (37.2  C) (Tympanic)   Resp 20   Wt 77.1 kg (170 lb)   SpO2 98%   BMI 25.10 kg/m    Body mass index is 25.1 kg/m .     Physical Exam  General Appearance: Well appearing adult male, appropriate appearance for age. No acute distress  Respiratory: normal chest wall and respirations.  Normal effort. No cough appreciated.  Cardiac: RRR with no murmurs  Abdomen: soft, nontender, no rigidity, no rebound tenderness or guarding, normal bowel sounds present  :  No suprapubic tenderness to palpation.  No CVA tenderness to palpation.    Musculoskeletal:  Equal movement of bilateral upper extremities.  Equal movement of bilateral lower extremities.  Normal gait.   Psychological: normal affect, alert, oriented, and pleasant.       Labs:  Results for  orders placed or performed in visit on 08/09/22   UA reflex to Microscopic and Culture     Status: Abnormal    Specimen: Urine, Midstream   Result Value Ref Range    Color Urine Yellow Colorless, Straw, Light Yellow, Yellow    Appearance Urine Clear Clear    Glucose Urine Negative Negative mg/dL    Bilirubin Urine Negative Negative    Ketones Urine Negative Negative mg/dL    Specific Gravity Urine 1.025 1.005 - 1.030    Blood Urine Negative Negative    pH Urine 5.0 5.0 - 9.0    Protein Albumin Urine Negative Negative mg/dL    Urobilinogen Urine Normal Normal, 2.0 mg/dL    Nitrite Urine Negative Negative    Leukocyte Esterase Urine Negative Negative    Mucus Urine Present (A) None Seen /LPF    RBC Urine 0 <=2 /HPF    WBC Urine <1 <=5 /HPF    Narrative    Microscopic not indicated  Urine Culture not indicated

## 2022-08-09 NOTE — NURSING NOTE
"Patient presents to the clinic for urinary burning for the past week.    FOOD SECURITY SCREENING QUESTIONS:    The next two questions are to help us understand your food security.  If you are feeling you need any assistance in this area, we have resources available to support you today.    Hunger Vital Signs:  Within the past 12 months we worried whether our food would run out before we got money to buy more. Never  Within the past 12 months the food we bought just didn't last and we didn't have money to get more. Never    Advance Care Directive on file? no  Advance Care Directive provided to patient? Declined.    Chief Complaint   Patient presents with     Urinary Problem       Initial /80 (BP Location: Left arm, Patient Position: Sitting, Cuff Size: Adult Large)   Pulse 82   Temp 98.9  F (37.2  C) (Tympanic)   Resp 20   Wt 77.1 kg (170 lb)   SpO2 98%   BMI 25.10 kg/m   Estimated body mass index is 25.1 kg/m  as calculated from the following:    Height as of 5/3/22: 1.753 m (5' 9\").    Weight as of this encounter: 77.1 kg (170 lb).  Medication Reconciliation: complete        Carol Tilley LPN    "

## 2022-08-12 LAB — BACTERIA UR CULT: NO GROWTH

## 2022-08-16 ENCOUNTER — THERAPY VISIT (OUTPATIENT)
Dept: CHIROPRACTIC MEDICINE | Facility: OTHER | Age: 47
End: 2022-08-16
Attending: CHIROPRACTOR
Payer: COMMERCIAL

## 2022-08-16 VITALS
HEART RATE: 72 BPM | RESPIRATION RATE: 18 BRPM | TEMPERATURE: 97.9 F | OXYGEN SATURATION: 98 % | SYSTOLIC BLOOD PRESSURE: 126 MMHG | DIASTOLIC BLOOD PRESSURE: 80 MMHG

## 2022-08-16 DIAGNOSIS — M54.41 CHRONIC BILATERAL LOW BACK PAIN WITH BILATERAL SCIATICA: ICD-10-CM

## 2022-08-16 DIAGNOSIS — G89.29 CHRONIC BILATERAL LOW BACK PAIN WITH BILATERAL SCIATICA: ICD-10-CM

## 2022-08-16 DIAGNOSIS — M99.02 SEGMENTAL AND SOMATIC DYSFUNCTION OF THORACIC REGION: ICD-10-CM

## 2022-08-16 DIAGNOSIS — M99.03 SEGMENTAL AND SOMATIC DYSFUNCTION OF LUMBAR REGION: Primary | ICD-10-CM

## 2022-08-16 DIAGNOSIS — M99.04 SEGMENTAL AND SOMATIC DYSFUNCTION OF SACRAL REGION: ICD-10-CM

## 2022-08-16 DIAGNOSIS — M54.42 CHRONIC BILATERAL LOW BACK PAIN WITH BILATERAL SCIATICA: ICD-10-CM

## 2022-08-16 PROCEDURE — 98941 CHIROPRACT MANJ 3-4 REGIONS: CPT | Mod: AT | Performed by: CHIROPRACTOR

## 2022-08-16 NOTE — PROGRESS NOTES
Started Sunday, Bilateral lower back is constant sharp shooting radiating down both legs. Rates 8/10 W24 8/10. Using tylenol it helps.  Dane Youngblood on 8/16/2022 at 7:41 AM    Reviewed by EW    Visit #:  6 PRN    Subjective:  Salomón Edwards is a 47 year old male who is seen in f/u up for:        Segmental and somatic dysfunction of lumbar region  Segmental and somatic dysfunction of sacral region  Chronic bilateral low back pain with bilateral sciatica  Segmental and somatic dysfunction of thoracic region.     Since last visit on 6/28/2022,  Salomón Edwards reports: Symptoms became reaggravated recently following working on projects at home.  Denies any recent traumatic events reaggravated symptoms.  Patient does take Tylenol to help manage symptoms which does seem to provide some level of help however patient does not like taking Tylenol.  Chiropractic care does seem to help provide some level of benefit.  Patient has gone through course of physical therapy.  Unclear if he is performing home exercises at this time.  Symptoms do radiate into the legs bilaterally.  No reported weakness or changes to bowel bladder habits at this time.    States that pain is making it difficult for him to walk.  When this happens patient often will present for chiropractic care.    Neck and upper back pain symptoms not present.  Has been noting some grinding sensations primarily of the neck and upper back.  (DVPRS) Pain Rating Score : Awful, hard to do anything (W24 8/10) (08/16/22 0734)     Objective:  The following was observed:  /80 (BP Location: Right arm, Patient Position: Sitting, Cuff Size: Adult Regular)   Pulse 72   Temp 97.9  F (36.6  C) (Tympanic)   Resp 18   SpO2 98%    Oswestry (MARISELA) Questionnaire    OSWESTRY DISABILITY INDEX 8/16/2022   Count 9   Sum 22   Oswestry Score (%) 48.89   Some recent data might be hidden      Improved from 60%    P: palpatory tenderness right PSIS, left side L5, :    A: static  palpation demonstrates intersegmental asymmetry , thoracic, lumbar, pelvis  R: motion palpation notes restricted motion, T4 , L5  and Sacrum   T: muscle spasm at level(s): Left quadratus lumborum, lumbar paraspinals bilaterally, left rhomboids:      Segmental spinal dysfunction/restrictions found at:  :  T4 Left lateral flexion restricted and Extension restriction  L5 Left lateral flexion restricted and Extension restriction  Sacrum Right lateral flexion restricted and Extension restriction.      Assessment: Exacerbation chronic back concerns.  Patient has presented in the past with neck complaints.  Assessment of patient cervical spine does not show segmental/somatic dysfunction present.  Continue to monitor for concerns of patient cervical spine.    Diagnoses:      1. Segmental and somatic dysfunction of lumbar region    2. Segmental and somatic dysfunction of sacral region    3. Chronic bilateral low back pain with bilateral sciatica    4. Segmental and somatic dysfunction of thoracic region        Patient's condition:  Patient had restrictions pre-manipulation    Treatment effectiveness:  Post manipulation there is better intersegmental movement and Patient claims to feel looser post manipulation      Procedures:  CMT:  31149 Chiropractic manipulative treatment 3-4 regions performed   Thoracic: Diversified, T4, Prone  Lumbar: Diversified, L5, Side posture  Pelvis: Diversified, Sacrum , Side posture    Modalities:  None performed this visit    Therapeutic procedures:  None    Response to Treatment  Reduction in symptoms as reported by patient    Prognosis: Good    Progress towards Goals: exacerbation    Recommendations:    Instructions:ice 20 minutes every other hour as needed and heat 15 minutes every other hour as needed    Follow-up:  Continue treatment PRN.

## 2022-12-08 ENCOUNTER — OFFICE VISIT (OUTPATIENT)
Dept: FAMILY MEDICINE | Facility: OTHER | Age: 47
End: 2022-12-08
Attending: NURSE PRACTITIONER
Payer: COMMERCIAL

## 2022-12-08 VITALS
WEIGHT: 168 LBS | DIASTOLIC BLOOD PRESSURE: 76 MMHG | SYSTOLIC BLOOD PRESSURE: 120 MMHG | TEMPERATURE: 97.5 F | BODY MASS INDEX: 24.81 KG/M2 | RESPIRATION RATE: 20 BRPM | OXYGEN SATURATION: 98 % | HEART RATE: 74 BPM

## 2022-12-08 DIAGNOSIS — B07.0 PLANTAR WART: Primary | ICD-10-CM

## 2022-12-08 PROCEDURE — 17110 DESTRUCTION B9 LES UP TO 14: CPT | Performed by: NURSE PRACTITIONER

## 2022-12-08 ASSESSMENT — ANXIETY QUESTIONNAIRES
5. BEING SO RESTLESS THAT IT IS HARD TO SIT STILL: NOT AT ALL
3. WORRYING TOO MUCH ABOUT DIFFERENT THINGS: NOT AT ALL
GAD7 TOTAL SCORE: 0
1. FEELING NERVOUS, ANXIOUS, OR ON EDGE: NOT AT ALL
7. FEELING AFRAID AS IF SOMETHING AWFUL MIGHT HAPPEN: NOT AT ALL
GAD7 TOTAL SCORE: 0
2. NOT BEING ABLE TO STOP OR CONTROL WORRYING: NOT AT ALL
6. BECOMING EASILY ANNOYED OR IRRITABLE: NOT AT ALL
IF YOU CHECKED OFF ANY PROBLEMS ON THIS QUESTIONNAIRE, HOW DIFFICULT HAVE THESE PROBLEMS MADE IT FOR YOU TO DO YOUR WORK, TAKE CARE OF THINGS AT HOME, OR GET ALONG WITH OTHER PEOPLE: NOT DIFFICULT AT ALL

## 2022-12-08 ASSESSMENT — PATIENT HEALTH QUESTIONNAIRE - PHQ9
5. POOR APPETITE OR OVEREATING: NOT AT ALL
SUM OF ALL RESPONSES TO PHQ QUESTIONS 1-9: 0

## 2022-12-08 ASSESSMENT — PAIN SCALES - GENERAL: PAINLEVEL: NO PAIN (0)

## 2022-12-08 NOTE — NURSING NOTE
"Patient presents to the clinic for wart removal.    FOOD SECURITY SCREENING QUESTIONS:    The next two questions are to help us understand your food security.  If you are feeling you need any assistance in this area, we have resources available to support you today.    Hunger Vital Signs:  Within the past 12 months we worried whether our food would run out before we got money to buy more. Never  Within the past 12 months the food we bought just didn't last and we didn't have money to get more. Never    Advance Care Directive on file? no  Advance Care Directive provided to patient? Declined.    Chief Complaint   Patient presents with     Derm Problem       Initial /76 (BP Location: Right arm, Patient Position: Sitting, Cuff Size: Adult Large)   Pulse 74   Temp 97.5  F (36.4  C) (Tympanic)   Resp 20   Wt 76.2 kg (168 lb)   SpO2 98%   BMI 24.81 kg/m   Estimated body mass index is 24.81 kg/m  as calculated from the following:    Height as of 5/3/22: 1.753 m (5' 9\").    Weight as of this encounter: 76.2 kg (168 lb).  Medication Reconciliation: complete        Carol Tilley LPN       "

## 2023-02-13 ENCOUNTER — THERAPY VISIT (OUTPATIENT)
Dept: CHIROPRACTIC MEDICINE | Facility: OTHER | Age: 48
End: 2023-02-13
Attending: CHIROPRACTOR
Payer: COMMERCIAL

## 2023-02-13 VITALS
TEMPERATURE: 98.4 F | OXYGEN SATURATION: 97 % | RESPIRATION RATE: 18 BRPM | DIASTOLIC BLOOD PRESSURE: 82 MMHG | SYSTOLIC BLOOD PRESSURE: 128 MMHG | HEART RATE: 89 BPM

## 2023-02-13 DIAGNOSIS — M54.50 BILATERAL LOW BACK PAIN WITHOUT SCIATICA, UNSPECIFIED CHRONICITY: ICD-10-CM

## 2023-02-13 DIAGNOSIS — M99.02 SEGMENTAL AND SOMATIC DYSFUNCTION OF THORACIC REGION: Primary | ICD-10-CM

## 2023-02-13 DIAGNOSIS — M99.03 SEGMENTAL AND SOMATIC DYSFUNCTION OF LUMBAR REGION: ICD-10-CM

## 2023-02-13 DIAGNOSIS — M47.816 FACET ARTHROPATHY, LUMBAR: ICD-10-CM

## 2023-02-13 DIAGNOSIS — M99.04 SEGMENTAL AND SOMATIC DYSFUNCTION OF SACRAL REGION: ICD-10-CM

## 2023-02-13 PROCEDURE — 99212 OFFICE O/P EST SF 10 MIN: CPT | Mod: 25 | Performed by: CHIROPRACTOR

## 2023-02-13 PROCEDURE — 98941 CHIROPRACT MANJ 3-4 REGIONS: CPT | Mod: AT | Performed by: CHIROPRACTOR

## 2023-02-13 NOTE — PROGRESS NOTES
Flared up about two weeks ago. Bilateral lower back with constant aching, pressure, constipation. 6/10 W24 9/10. Using stretches with a small decrease.   Urmila Velásquez  on 2/13/2023 at 3:10 PM    Reviewed by EW    Visit #:  1    Subjective:  Salomón Ewdards is a 48 year old male who is seen  for:        Segmental and somatic dysfunction of thoracic region  Segmental and somatic dysfunction of lumbar region  Segmental and somatic dysfunction of sacral region  Facet arthropathy, lumbar  Bilateral low back pain without sciatica, unspecified chronicity.      Salomón Edwards reports: Experienced recent exacerbation of back pain symptoms stating recent snowmobiling and fishing trips as factors.  No apparent traumatic episodes or overuse.  Denies any radicular complaints of the lower extremities, no weakness of the lower extremities, denies any loss of bowel bladder function or saddle paresthesia at this time.  Home stretches providing little if any help which is why patient is presenting to our office at this time.  Finds that gait is altered when there is restrictions involved in his back.    Patient does have questions regarding Tylenol versus aspirin    (DVPRS) Pain Rating Score : Hard to ignore, avoid usual activities (W24 9/10) (02/13/23 1505)     Objective:  The following was observed:  /82 (BP Location: Right arm, Patient Position: Sitting, Cuff Size: Adult Regular)   Pulse 89   Temp 98.4  F (36.9  C) (Tympanic)   Resp 18   SpO2 97%      Oswestry (MARISELA) Questionnaire    OSWESTRY DISABILITY INDEX 2/13/2023   Count 9   Sum 20   Oswestry Score (%) 44.44   Some recent data might be hidden      Thoracic/lumbar AROM: Right rotation restriction otherwise generally unremarkable    SLR: negative  Ely's: +right, +left    P: palpatory tendernessLeft PSIS, right side L5:    A: static palpation demonstrates intersegmental asymmetry , thoracic, lumbar, pelvis  R: motion palpation notes restricted motion, T4 , L5  and Sacrum    T: muscle spasm at level(s): Right quadratus lumborum, right rhomboids, hypertonicity throughout paraspinals of the lumbar and thoracic region:      Segmental spinal dysfunction/restrictions found at:  :  T4 Right lateral flexion restricted and Extension restriction  L5 Right lateral flexion restricted and Extension restriction  Sacrum Left lateral flexion restricted and Extension restriction.      Assessment: Segmental/somatic dysfunction present lumbopelvic spine and thoracic spine all of which are consistent with patient's subjective reports.  Patient may still benefit with chiropractic adjustments within this week as he is planning on going on a another fishing trip this weekend.  Plan to follow-up with patient on as-needed basis.    Regarding patient's concern of the Tylenol versus aspirin.  As I am not medically trained I did encourage patient to discuss this with primary physician    Diagnoses:      1. Segmental and somatic dysfunction of thoracic region    2. Segmental and somatic dysfunction of lumbar region    3. Segmental and somatic dysfunction of sacral region    4. Facet arthropathy, lumbar    5. Bilateral low back pain without sciatica, unspecified chronicity        Patient's condition:  Patient had restrictions pre-manipulation    Treatment effectiveness:  Post manipulation there is better intersegmental movement and Patient claims to feel looser post manipulation      Procedures:  E/M 20155    CMT:  80555 Chiropractic manipulative treatment 3-4 regions performed   Thoracic: Diversified, T4, Prone  Lumbar: Diversified, L5, Side posture  Pelvis: Diversified, Sacrum , Side posture    Modalities:  None performed this visit    Therapeutic procedures:  None    Response to Treatment  Reduction in symptoms as reported by patient    Prognosis: Good    Progress towards Goals: Reduce back pain 50%  Improved pain-free thoracic rotation  Decrease Oswestry score 20-30%    Recommendations:    Instructions:ice 20  minutes every other hour as needed and heat 15 minutes every other hour as needed    Follow-up:  Return to care if symptoms persist.

## 2023-02-22 ENCOUNTER — OFFICE VISIT (OUTPATIENT)
Dept: FAMILY MEDICINE | Facility: OTHER | Age: 48
End: 2023-02-22
Attending: NURSE PRACTITIONER
Payer: COMMERCIAL

## 2023-02-22 VITALS
OXYGEN SATURATION: 99 % | TEMPERATURE: 97.9 F | RESPIRATION RATE: 16 BRPM | HEART RATE: 72 BPM | BODY MASS INDEX: 25.77 KG/M2 | DIASTOLIC BLOOD PRESSURE: 86 MMHG | SYSTOLIC BLOOD PRESSURE: 134 MMHG | HEIGHT: 69 IN | WEIGHT: 174 LBS

## 2023-02-22 DIAGNOSIS — B07.0 PLANTAR WARTS: ICD-10-CM

## 2023-02-22 DIAGNOSIS — J20.9 ACUTE BRONCHITIS, UNSPECIFIED ORGANISM: Primary | ICD-10-CM

## 2023-02-22 PROCEDURE — 99213 OFFICE O/P EST LOW 20 MIN: CPT | Mod: 25 | Performed by: NURSE PRACTITIONER

## 2023-02-22 PROCEDURE — 17110 DESTRUCTION B9 LES UP TO 14: CPT | Performed by: NURSE PRACTITIONER

## 2023-02-22 RX ORDER — BUPROPION HYDROCHLORIDE 150 MG/1
1 TABLET ORAL
COMMUNITY
Start: 2023-01-18

## 2023-02-22 RX ORDER — AZITHROMYCIN 250 MG/1
TABLET, FILM COATED ORAL
Qty: 6 TABLET | Refills: 0 | Status: SHIPPED | OUTPATIENT
Start: 2023-02-22 | End: 2023-02-27

## 2023-02-22 ASSESSMENT — PAIN SCALES - GENERAL: PAINLEVEL: EXTREME PAIN (8)

## 2023-02-22 ASSESSMENT — ENCOUNTER SYMPTOMS: SHORTNESS OF BREATH: 1

## 2023-02-22 NOTE — PROGRESS NOTES
Assessment & Plan   Problem List Items Addressed This Visit    None  Visit Diagnoses     Acute bronchitis, unspecified organism    -  Primary    Relevant Medications    azithromycin (ZITHROMAX) 250 MG tablet    Plantar warts           Cough has been present for 4 to 6 weeks, no improvement, not worsening.  We will do a trial of azithromycin, if symptoms or not improving then would recommend further evaluation including imaging.    Single plantar wart was treated with paring down the callus and excess skin with an 11 blade and then treated with 3 passes of liquid nitrogen.  He tolerated this well.  Did recommend podiatry follow-up if this is not resolving.    Prescription drug management  25 minutes spent on the date of the encounter doing chart review, history and exam, documentation and further activities per the note       No follow-ups on file.    JOE Davenport [facility] AND [facility]    Tobi Lorenzana is a 48 year old, presenting for the following health issues:  Shortness of Breath      Shortness of Breath    History of Present Illness       Reason for visit:  Lungs  Symptom onset:  More than a month    He eats 2-3 servings of fruits and vegetables daily.He consumes 0 sweetened beverage(s) daily.He exercises with enough effort to increase his heart rate 20 to 29 minutes per day.  He exercises with enough effort to increase his heart rate 3 or less days per week.   He is taking medications regularly.     He comes in today for evaluation of cough as well as a plantar wart.    He reports having a cough since mid January, shortness of breath occasionally, congestion.  Cough is mildly productive.  Denies any history of asthma, no fevers.  He does not smoke.  He has not used anything over-the-counter for symptomatic management.  Symptoms do not seem to be improving.    He also has a wart to the bottom of his right foot.  He was seen in December 8, 2022 for this, liquid nitrogen was used  "after paring down scale with an 11 blade.  He reports this did not show any significant changes.  He has had this worked for a long time.    Review of Systems   Respiratory: Positive for shortness of breath.    See above        Objective    /86   Pulse 72   Temp 97.9  F (36.6  C)   Resp 16   Ht 1.753 m (5' 9\")   Wt 78.9 kg (174 lb)   SpO2 99%   BMI 25.70 kg/m    Body mass index is 25.7 kg/m .  Physical Exam   GENERAL: healthy, alert and no distress  EYES: Eyes grossly normal to inspection, PERRL and conjunctivae and sclerae normal  HENT: ear canals and TM's normal, nose and mouth without ulcers or lesions  NECK: no adenopathy, no asymmetry, masses, or scars and thyroid normal to palpation  RESP: lungs clear to auscultation - no rales, rhonchi or wheezes, no respiratory distress  CV: regular rate and rhythm, normal S1 S2  SKIN: Large plantar wart to bottom of right foot just below fifth toe  NEURO: Normal strength and tone, mentation intact and speech normal  PSYCH: mentation appears normal, affect normal/bright            "

## 2023-02-22 NOTE — PATIENT INSTRUCTIONS
Dr Angélica Alvarez-[facility] in [place]  [address] [place], [place]   [phone]    Dr Bhupendra Knox  [facility]-podiatry

## 2023-02-22 NOTE — NURSING NOTE
Patient presents today for shortness of breath and chest congestion over the last month.    Medication Reconciliation Complete    Nurys Reno LPN  2/22/2023 3:00 PM

## 2023-03-08 ENCOUNTER — MYC MEDICAL ADVICE (OUTPATIENT)
Dept: FAMILY MEDICINE | Facility: OTHER | Age: 48
End: 2023-03-08
Payer: COMMERCIAL

## 2023-03-08 DIAGNOSIS — B07.0 PLANTAR WARTS: Primary | ICD-10-CM

## 2023-07-31 ENCOUNTER — TELEPHONE (OUTPATIENT)
Dept: LAB | Facility: OTHER | Age: 48
End: 2023-07-31
Payer: COMMERCIAL

## 2023-07-31 NOTE — PROGRESS NOTES
Patient coming for lab work on Thursday. Please order labs if needed. Thank you    Never mind patient is hand carrying orders

## 2023-08-09 ENCOUNTER — LAB (OUTPATIENT)
Dept: LAB | Facility: OTHER | Age: 48
End: 2023-08-09
Payer: COMMERCIAL

## 2023-08-09 DIAGNOSIS — Z13.89 GOUT SCREEN: Primary | ICD-10-CM

## 2023-08-09 LAB
ALBUMIN SERPL BCG-MCNC: 4.8 G/DL (ref 3.5–5.2)
ALBUMIN UR-MCNC: NEGATIVE MG/DL
ALP SERPL-CCNC: 68 U/L (ref 40–129)
ALT SERPL W P-5'-P-CCNC: 33 U/L (ref 0–70)
ANION GAP SERPL CALCULATED.3IONS-SCNC: 11 MMOL/L (ref 7–15)
APPEARANCE UR: CLEAR
AST SERPL W P-5'-P-CCNC: 20 U/L (ref 0–45)
BASOPHILS # BLD AUTO: 0.1 10E3/UL (ref 0–0.2)
BASOPHILS NFR BLD AUTO: 1 %
BILIRUB SERPL-MCNC: 0.4 MG/DL
BILIRUB UR QL STRIP: NEGATIVE
BUN SERPL-MCNC: 15.1 MG/DL (ref 6–20)
CALCIUM SERPL-MCNC: 10 MG/DL (ref 8.6–10)
CHLORIDE SERPL-SCNC: 100 MMOL/L (ref 98–107)
COLOR UR AUTO: NORMAL
CREAT SERPL-MCNC: 0.87 MG/DL (ref 0.67–1.17)
DEPRECATED HCO3 PLAS-SCNC: 27 MMOL/L (ref 22–29)
EOSINOPHIL # BLD AUTO: 0.2 10E3/UL (ref 0–0.7)
EOSINOPHIL NFR BLD AUTO: 2 %
ERYTHROCYTE [DISTWIDTH] IN BLOOD BY AUTOMATED COUNT: 12.2 % (ref 10–15)
GFR SERPL CREATININE-BSD FRML MDRD: >90 ML/MIN/1.73M2
GLUCOSE SERPL-MCNC: 127 MG/DL (ref 70–99)
GLUCOSE UR STRIP-MCNC: NEGATIVE MG/DL
HBA1C MFR BLD: 5.2 % (ref 4–6.2)
HCT VFR BLD AUTO: 44.9 % (ref 40–53)
HGB BLD-MCNC: 15.5 G/DL (ref 13.3–17.7)
HGB UR QL STRIP: NEGATIVE
IMM GRANULOCYTES # BLD: 0 10E3/UL
IMM GRANULOCYTES NFR BLD: 0 %
KETONES UR STRIP-MCNC: NEGATIVE MG/DL
LEUKOCYTE ESTERASE UR QL STRIP: NEGATIVE
LYMPHOCYTES # BLD AUTO: 2.1 10E3/UL (ref 0.8–5.3)
LYMPHOCYTES NFR BLD AUTO: 27 %
MCH RBC QN AUTO: 30.5 PG (ref 26.5–33)
MCHC RBC AUTO-ENTMCNC: 34.5 G/DL (ref 31.5–36.5)
MCV RBC AUTO: 88 FL (ref 78–100)
MONOCYTES # BLD AUTO: 0.6 10E3/UL (ref 0–1.3)
MONOCYTES NFR BLD AUTO: 8 %
NEUTROPHILS # BLD AUTO: 4.9 10E3/UL (ref 1.6–8.3)
NEUTROPHILS NFR BLD AUTO: 62 %
NITRATE UR QL: NEGATIVE
NRBC # BLD AUTO: 0 10E3/UL
NRBC BLD AUTO-RTO: 0 /100
PH UR STRIP: 5.5 [PH] (ref 5–9)
PLATELET # BLD AUTO: 386 10E3/UL (ref 150–450)
POTASSIUM SERPL-SCNC: 4.2 MMOL/L (ref 3.4–5.3)
PROT SERPL-MCNC: 6.9 G/DL (ref 6.4–8.3)
RBC # BLD AUTO: 5.08 10E6/UL (ref 4.4–5.9)
SODIUM SERPL-SCNC: 138 MMOL/L (ref 136–145)
SP GR UR STRIP: 1.02 (ref 1–1.03)
TSH SERPL DL<=0.005 MIU/L-ACNC: 2.23 UIU/ML (ref 0.3–4.2)
UROBILINOGEN UR STRIP-MCNC: NORMAL MG/DL
WBC # BLD AUTO: 7.9 10E3/UL (ref 4–11)

## 2023-08-09 PROCEDURE — 84443 ASSAY THYROID STIM HORMONE: CPT | Mod: ZL

## 2023-08-09 PROCEDURE — 85025 COMPLETE CBC W/AUTO DIFF WBC: CPT | Mod: ZL

## 2023-08-09 PROCEDURE — 83036 HEMOGLOBIN GLYCOSYLATED A1C: CPT | Mod: ZL

## 2023-08-09 PROCEDURE — 80053 COMPREHEN METABOLIC PANEL: CPT | Mod: ZL

## 2023-08-09 PROCEDURE — 81003 URINALYSIS AUTO W/O SCOPE: CPT | Mod: ZL

## 2023-08-09 PROCEDURE — 36415 COLL VENOUS BLD VENIPUNCTURE: CPT | Mod: ZL

## 2024-02-13 ENCOUNTER — VIRTUAL VISIT (OUTPATIENT)
Dept: INTERNAL MEDICINE | Facility: OTHER | Age: 49
End: 2024-02-13
Attending: INTERNAL MEDICINE
Payer: COMMERCIAL

## 2024-02-13 DIAGNOSIS — G89.29 CHRONIC RIGHT-SIDED LOW BACK PAIN WITHOUT SCIATICA: ICD-10-CM

## 2024-02-13 DIAGNOSIS — M51.369 DDD (DEGENERATIVE DISC DISEASE), LUMBAR: Primary | ICD-10-CM

## 2024-02-13 DIAGNOSIS — M54.50 CHRONIC RIGHT-SIDED LOW BACK PAIN WITHOUT SCIATICA: ICD-10-CM

## 2024-02-13 PROCEDURE — 99212 OFFICE O/P EST SF 10 MIN: CPT | Mod: 95

## 2024-02-13 ASSESSMENT — PATIENT HEALTH QUESTIONNAIRE - PHQ9
SUM OF ALL RESPONSES TO PHQ QUESTIONS 1-9: 0
SUM OF ALL RESPONSES TO PHQ QUESTIONS 1-9: 0
10. IF YOU CHECKED OFF ANY PROBLEMS, HOW DIFFICULT HAVE THESE PROBLEMS MADE IT FOR YOU TO DO YOUR WORK, TAKE CARE OF THINGS AT HOME, OR GET ALONG WITH OTHER PEOPLE: NOT DIFFICULT AT ALL

## 2024-02-13 NOTE — PROGRESS NOTES
"Salomón is a 49 year old who is being evaluated via a billable video visit.      How would you like to obtain your AVS? MyChart  If the video visit is dropped, the invitation should be resent by: Send to e-mail at: [email or web address]  Will anyone else be joining your video visit? No          Assessment & Plan       ICD-10-CM    1. DDD (degenerative disc disease), lumbar  M51.36 MR Lumbar Spine w/o Contrast     Spine  Referral      2. Chronic right-sided low back pain without sciatica  M54.50 MR Lumbar Spine w/o Contrast    G89.29 Spine  Referral        Referral to spine specialist was placed.  Patient would like to be seen at the [facility].  Additionally, we will update his MRI- order has been placed.  Patient declines wanting epidural injection if indicated at this time.  Would like to defer to speaking with specialist.      BMI  Estimated body mass index is 25.7 kg/m  as calculated from the following:    Height as of 2/22/23: 1.753 m (5' 9\").    Weight as of 2/22/23: 78.9 kg (174 lb).             No follow-ups on file.    Subjective   Salomón is a 49 year old, presenting for the following health issues:    Patient presents for video visit with concerns of ongoing lumbar back pain.  He has been going to physical therapy without improvement.  He has a history of progressive facet degeneration.  He has mild radiculopathy into his buttock region.  He denies any new injuries, denies any bowel or bladder dysfunction. He has been taking Tylenol and ibuprofen daily for pain.  He is interested in having further evaluation regarding his back pain and would like to see a specialist regarding further long-term treatment and management.      RECHECK (Wants referral for spinal imaging )      History of Present Illness       Reason for visit:  Referral for spinal images    He eats 2-3 servings of fruits and vegetables daily.He consumes 0 sweetened beverage(s) daily.He exercises with enough effort to " increase his heart rate 30 to 60 minutes per day.  He exercises with enough effort to increase his heart rate 6 days per week.   He is taking medications regularly.     Review of Systems   Musculoskeletal: Positive for back pain.   All other systems reviewed and are negative.                Objective           Vitals:  No vitals were obtained today due to virtual visit.    Physical Exam    GENERAL: alert and no distress  EYES: Eyes grossly normal to inspection.  No discharge or erythema, or obvious scleral/conjunctival abnormalities.  RESP: No audible wheeze, cough, or visible cyanosis.    SKIN: Visible skin clear. No significant rash, abnormal pigmentation or lesions.  NEURO: Cranial nerves grossly intact.  Mentation and speech appropriate for age.  PSYCH: Appropriate affect, tone, and pace of words          Video-Visit Details    Type of service:  Video Visit     Originating Location (pt. Location): Home    Distant Location (provider location):  On-site  Platform used for Video Visit: Davian  Signed Electronically by: JOE Rice CNP

## 2024-02-13 NOTE — NURSING NOTE
"Chief Complaint   Patient presents with    RECHECK     Wants referral for spinal imaging      Patient presents for a recheck for a spinal imaging referral  Initial There were no vitals taken for this visit. Estimated body mass index is 25.7 kg/m  as calculated from the following:    Height as of 2/22/23: 1.753 m (5' 9\").    Weight as of 2/22/23: 78.9 kg (174 lb).  Meds Reconciled: complete      Lali Smith LPN,AKANKSHA on 2/13/2024 at 11:41 AM  Ext. [phone]        Lali Smith LPN  "

## 2024-02-15 ASSESSMENT — ENCOUNTER SYMPTOMS: BACK PAIN: 1

## 2024-02-20 ENCOUNTER — HOSPITAL ENCOUNTER (OUTPATIENT)
Dept: MRI IMAGING | Facility: OTHER | Age: 49
Discharge: HOME | End: 2024-02-20
Payer: COMMERCIAL

## 2024-02-20 DIAGNOSIS — G89.29 CHRONIC RIGHT-SIDED LOW BACK PAIN WITHOUT SCIATICA: ICD-10-CM

## 2024-02-20 DIAGNOSIS — M51.369 DDD (DEGENERATIVE DISC DISEASE), LUMBAR: ICD-10-CM

## 2024-02-20 DIAGNOSIS — M54.50 CHRONIC RIGHT-SIDED LOW BACK PAIN WITHOUT SCIATICA: ICD-10-CM

## 2024-02-20 PROCEDURE — 72148 MRI LUMBAR SPINE W/O DYE: CPT

## 2024-02-21 ENCOUNTER — MYC MEDICAL ADVICE (OUTPATIENT)
Dept: INTERNAL MEDICINE | Facility: OTHER | Age: 49
End: 2024-02-21
Payer: COMMERCIAL

## 2024-04-23 ENCOUNTER — THERAPY VISIT (OUTPATIENT)
Dept: CHIROPRACTIC MEDICINE | Facility: OTHER | Age: 49
End: 2024-04-23
Attending: CHIROPRACTOR
Payer: COMMERCIAL

## 2024-04-23 VITALS — OXYGEN SATURATION: 97 % | HEART RATE: 106 BPM | RESPIRATION RATE: 14 BRPM | TEMPERATURE: 98.1 F

## 2024-04-23 DIAGNOSIS — M99.04 SEGMENTAL AND SOMATIC DYSFUNCTION OF SACRAL REGION: ICD-10-CM

## 2024-04-23 DIAGNOSIS — M47.816 FACET ARTHROPATHY, LUMBAR: ICD-10-CM

## 2024-04-23 DIAGNOSIS — M99.03 SEGMENTAL AND SOMATIC DYSFUNCTION OF LUMBAR REGION: Primary | ICD-10-CM

## 2024-04-23 PROCEDURE — 98940 CHIROPRACT MANJ 1-2 REGIONS: CPT | Mod: AT | Performed by: CHIROPRACTOR

## 2024-04-23 PROCEDURE — 99212 OFFICE O/P EST SF 10 MIN: CPT | Mod: 25 | Performed by: CHIROPRACTOR

## 2024-04-23 NOTE — PROGRESS NOTES
Right lower back is feeling constant sharp pain that flared two days ago. 8/10 W24 10/10. Using stretches and NSAIDs with no change in pain noted.  Ramila Horansummer on 4/23/2024 at 9:51 AM     Reviewed by EW    Visit #:  1/4-6    Subjective:  Salomón Edwards is a 49 year old male who is seen in f/u up for:        Segmental and somatic dysfunction of lumbar region  Segmental and somatic dysfunction of sacral region  Facet arthropathy, lumbar.      Salomón Edwards reports: Back pain has been flaring up.  Review of chart shows on 2/13/2024 patient was seen virtually for concerns of low back pain.  MRI orders were placed and referral also placed for patient to be seen at the [facility].  Review of chart also shows that there were no red flags at that time with mild radiculopathy into the buttocks.    Next week patient was assessed at the [facility].  Was informed that he is a surgical candidate.  Review of record shows they discussed multiple treatment options such as additional physical therapy, myofascial treatment with [facility] chiropractor, OTC medications, topicals, back bracing, injections.  Review of chart has shown injections to be not beneficial for patient.    Continues to perform physical therapy and to be as active as he tolerates.    Patient currently experiencing some numbness in the left leg, patient reports that he normally experiences this on the right side.  Patient does feel that when he walks his right foot will flip out.    In discussing patient's gym activities patient notes that overhead pulling such as latissimus pull downs and pull-ups will aggravate low back.  Discussed horizontal rowing motion which appears to be more tolerated.    No red flags    (DVPRS) Pain Rating Score : 8-Awful, hard to do anything (W24 10/10) (04/23/24 0948)     Objective:  The following was observed:  Pulse 106   Temp 98.1  F (36.7  C) (Tympanic)   Resp 14   SpO2 97%      Oswestry (MARISELA) Questionnaire        4/23/2024      9:51 AM   OSWESTRY DISABILITY INDEX   Count 9   Sum 32   Oswestry Score (%) 71.11 %      Posture observation: Patient visibly antalgic to the left, no forward posture noted    Patient does walk with slight right toe out.  No obvious signs of dropfoot or other alterations to gait.    Slumps: - right, - left  Ely's: -right, -left    P: palpatory tenderness L5/S1:    A: static palpation demonstrates intersegmental asymmetry , lumbar, pelvis  R: motion palpation notes restricted motion, L5  and Sacrum   T: Hypertonicity throughout quadratus lumborum and lumbar paraspinals bilaterally    Segmental spinal dysfunction/restrictions found at:  :  L5 Left lateral flexion restricted and Extension restriction  Sacrum Right lateral flexion restricted and Extension restriction.      Assessment: Acute exacerbation of symptoms most likely secondary to facet lumbar arthropathy.  Patient has been under care in the past with beneficial results to chiropractic treatments.  We will attempt new trial of care for up to 6 visits over the next 4-6 weeks pending response to treatment.  Patient recently worked with [facility] and has been considered to be a surgical candidate.  Patient also notes that [facility] recommended consulting with their chiropractic provider for possible myofascial technique.  This would likely be a good option for patient care.  Will work to coordinate care efforts with [facility] and care practice provider if patient wishes to pursue this.    Patient did report that doing overhead pulling motion such as lat pull downs or pull ups. Unsure but believes that rows have not aggravated symptoms. Suspect performing overhead pulling actions are creating hyperextension of the lumbar spine.    Diagnoses:      1. Segmental and somatic dysfunction of lumbar region    2. Segmental and somatic dysfunction of sacral region    3. Facet arthropathy, lumbar        Patient's condition:  Patient had restrictions  pre-manipulation    Treatment effectiveness:  Post manipulation there is better intersegmental movement, no antalgic posture post adjustment. Patient reports feeling like hip is rotated      Procedures:  E/M 14662    CMT:  22681 Chiropractic manipulative treatment 1-2 regions performed   Lumbar: Diversified, L5, Side posture  Pelvis: Diversified, Sacrum , Side posture    Modalities:  None performed this visit    Therapeutic procedures:  None    Response to Treatment  No reported change in pain symptoms, no antalgic posture noted posttreatment    Prognosis: Guarded    Progress towards Goals: Improve pain symptoms up to 50% within 4 weeks  Improve Oswestry score 10-20% within 4 weeks       Recommendations:    Instructions: Monitor symptoms and perform activities as tolerated.    Follow-up:  Return to care in 3 days.

## 2024-04-24 ENCOUNTER — THERAPY VISIT (OUTPATIENT)
Dept: CHIROPRACTIC MEDICINE | Facility: OTHER | Age: 49
End: 2024-04-24
Attending: CHIROPRACTOR
Payer: COMMERCIAL

## 2024-04-24 VITALS — HEART RATE: 100 BPM | OXYGEN SATURATION: 96 % | RESPIRATION RATE: 14 BRPM

## 2024-04-24 DIAGNOSIS — M99.04 SEGMENTAL AND SOMATIC DYSFUNCTION OF SACRAL REGION: ICD-10-CM

## 2024-04-24 DIAGNOSIS — M47.816 FACET ARTHROPATHY, LUMBAR: ICD-10-CM

## 2024-04-24 DIAGNOSIS — M99.03 SEGMENTAL AND SOMATIC DYSFUNCTION OF LUMBAR REGION: Primary | ICD-10-CM

## 2024-04-24 PROCEDURE — 98940 CHIROPRACT MANJ 1-2 REGIONS: CPT | Mod: AT | Performed by: CHIROPRACTOR

## 2024-04-24 NOTE — PROGRESS NOTES
Right lower back is constantly tight with frequent sharp pains with some movements. 8/10 W24 10/10. Using heat and NSAIDS. NSAIDS helped some.  Ramila Pereira on 4/24/2024 at 12:00 PM    Reviewed by EW    Visit #:  2    Subjective:  Salomón Edwards is a 49 year old male who is seen in f/u up for:        Segmental and somatic dysfunction of lumbar region  Segmental and somatic dysfunction of sacral region  Facet arthropathy, lumbar.     Since last visit on 4/23/2024,  Salomón Edwards reports: Symptoms did well initially after yesterday's appointment.  States that upon awakening this morning back began to tighten up which prompted him to contact our office for evaluation and treatment.        (DVPRS) Pain Rating Score : 8-Awful, hard to do anything (W24 10/10) (04/24/24 1158)     Objective:  The following was observed:  Pulse 100   Resp 14   SpO2 96%      Mildly antalgic posture to the left, appears improved comparatively to yesterday    P: palpatory tenderness not reported by patient:    A: static palpation demonstrates intersegmental asymmetry , lumbar, pelvis  R: motion palpation notes restricted motion, L5  and Sacrum   T: Moderate spasms noted left quadratus lumborum, right lumbar paraspinals    Segmental spinal dysfunction/restrictions found at:    L5 Left lateral flexion restricted and Extension restriction  Sacrum Right lateral flexion restricted and Extension restriction.         Assessment: Patient is showing early positive response to treatment.  Suspect that treatment may be more involved than originally anticipated.  Because of this plan to extend total treatment visits to between 6-8 visits over the next 4-6 weeks.  Patient is scheduled to follow-up with our office at the end of the week.  However if symptoms exacerbate again tomorrow patient encouraged to follow-up with her office again tomorrow.    Diagnoses:      1. Segmental and somatic dysfunction of lumbar region    2. Segmental and somatic  dysfunction of sacral region    3. Facet arthropathy, lumbar        Patient's condition:  Patient had restrictions pre-manipulation    Treatment effectiveness:  Post manipulation there is better intersegmental movement      Procedures:  CMT:  14577 Chiropractic manipulative treatment 1-2 regions performed   Lumbar: Diversified, L5, Side posture  Pelvis: Diversified, Sacrum , Side posture    Modalities:  None performed this visit    Therapeutic procedures:  None    Response to Treatment  No antalgic posture noted posttreatment    Prognosis: Good    Progress towards Goals: Patient is making progress towards the goal.     Recommendations:    Instructions: monitor symptoms    Follow-up:  Return to care in 2 days, sooner if symptoms worsen in the morning.

## 2024-07-29 ENCOUNTER — TELEPHONE (OUTPATIENT)
Dept: INTERNAL MEDICINE | Facility: OTHER | Age: 49
End: 2024-07-29
Payer: COMMERCIAL

## 2024-07-29 NOTE — TELEPHONE ENCOUNTER
Travis-patient would like Aby to take stitches out of back he is scheduled with [facility] room on 07.31.24 and would also like to talk about follow up care ect        Please call and advise    Thank You    Princess Argueta on 7/29/2024 at 3:59 PM

## 2024-07-30 NOTE — TELEPHONE ENCOUNTER
Left message for patient to return call, Provider said she would see patient at 1:40 On 07/31/24 to remove stitches in back. He would also need the nurse only visit on 07/31/24 cancelled.  Lali Smith LPN on 7/30/2024 at 10:05 AM  EXT. [phone]

## 2024-07-31 ENCOUNTER — OFFICE VISIT (OUTPATIENT)
Dept: INTERNAL MEDICINE | Facility: OTHER | Age: 49
End: 2024-07-31
Payer: COMMERCIAL

## 2024-07-31 VITALS
RESPIRATION RATE: 16 BRPM | TEMPERATURE: 97.7 F | BODY MASS INDEX: 25.99 KG/M2 | HEART RATE: 82 BPM | SYSTOLIC BLOOD PRESSURE: 142 MMHG | DIASTOLIC BLOOD PRESSURE: 92 MMHG | WEIGHT: 176 LBS | OXYGEN SATURATION: 98 %

## 2024-07-31 DIAGNOSIS — R79.89 ELEVATED PLATELET COUNT: ICD-10-CM

## 2024-07-31 DIAGNOSIS — Z12.11 COLON CANCER SCREENING: ICD-10-CM

## 2024-07-31 DIAGNOSIS — Z98.890 STATUS POST DISCECTOMY: ICD-10-CM

## 2024-07-31 DIAGNOSIS — Z11.4 SCREENING FOR HIV (HUMAN IMMUNODEFICIENCY VIRUS): ICD-10-CM

## 2024-07-31 DIAGNOSIS — M48.062 SPINAL STENOSIS OF LUMBAR REGION WITH NEUROGENIC CLAUDICATION: ICD-10-CM

## 2024-07-31 DIAGNOSIS — Z11.59 NEED FOR HEPATITIS C SCREENING TEST: ICD-10-CM

## 2024-07-31 DIAGNOSIS — E78.2 MIXED HYPERLIPIDEMIA: ICD-10-CM

## 2024-07-31 DIAGNOSIS — Z00.00 ROUTINE GENERAL MEDICAL EXAMINATION AT A HEALTH CARE FACILITY: Primary | ICD-10-CM

## 2024-07-31 DIAGNOSIS — R06.83 SNORING: ICD-10-CM

## 2024-07-31 LAB
BASOPHILS # BLD AUTO: 0.1 10E3/UL (ref 0–0.2)
BASOPHILS NFR BLD AUTO: 1 %
CHOLEST SERPL-MCNC: 191 MG/DL
EOSINOPHIL # BLD AUTO: 0.2 10E3/UL (ref 0–0.7)
EOSINOPHIL NFR BLD AUTO: 3 %
ERYTHROCYTE [DISTWIDTH] IN BLOOD BY AUTOMATED COUNT: 12.4 % (ref 10–15)
FASTING STATUS PATIENT QL REPORTED: ABNORMAL
HCT VFR BLD AUTO: 41.6 % (ref 40–53)
HDLC SERPL-MCNC: 61 MG/DL
HGB BLD-MCNC: 13.9 G/DL (ref 13.3–17.7)
IMM GRANULOCYTES # BLD: 0 10E3/UL
IMM GRANULOCYTES NFR BLD: 0 %
LDLC SERPL CALC-MCNC: 102 MG/DL
LYMPHOCYTES # BLD AUTO: 1.7 10E3/UL (ref 0.8–5.3)
LYMPHOCYTES NFR BLD AUTO: 23 %
MCH RBC QN AUTO: 30.2 PG (ref 26.5–33)
MCHC RBC AUTO-ENTMCNC: 33.4 G/DL (ref 31.5–36.5)
MCV RBC AUTO: 90 FL (ref 78–100)
MONOCYTES # BLD AUTO: 0.4 10E3/UL (ref 0–1.3)
MONOCYTES NFR BLD AUTO: 6 %
NEUTROPHILS # BLD AUTO: 4.8 10E3/UL (ref 1.6–8.3)
NEUTROPHILS NFR BLD AUTO: 67 %
NONHDLC SERPL-MCNC: 130 MG/DL
NRBC # BLD AUTO: 0 10E3/UL
NRBC BLD AUTO-RTO: 0 /100
PLATELET # BLD AUTO: 605 10E3/UL (ref 150–450)
RBC # BLD AUTO: 4.61 10E6/UL (ref 4.4–5.9)
TRIGL SERPL-MCNC: 140 MG/DL
WBC # BLD AUTO: 7.2 10E3/UL (ref 4–11)

## 2024-07-31 PROCEDURE — 85045 AUTOMATED RETICULOCYTE COUNT: CPT | Mod: ZL

## 2024-07-31 PROCEDURE — 83718 ASSAY OF LIPOPROTEIN: CPT | Mod: ZL

## 2024-07-31 PROCEDURE — 86803 HEPATITIS C AB TEST: CPT | Mod: ZL

## 2024-07-31 PROCEDURE — 99214 OFFICE O/P EST MOD 30 MIN: CPT | Mod: 25

## 2024-07-31 PROCEDURE — 36415 COLL VENOUS BLD VENIPUNCTURE: CPT | Mod: ZL

## 2024-07-31 PROCEDURE — 82465 ASSAY BLD/SERUM CHOLESTEROL: CPT | Mod: ZL

## 2024-07-31 PROCEDURE — 85025 COMPLETE CBC W/AUTO DIFF WBC: CPT | Mod: ZL

## 2024-07-31 PROCEDURE — 85004 AUTOMATED DIFF WBC COUNT: CPT | Mod: ZL

## 2024-07-31 PROCEDURE — 87389 HIV-1 AG W/HIV-1&-2 AB AG IA: CPT | Mod: ZL

## 2024-07-31 PROCEDURE — 99396 PREV VISIT EST AGE 40-64: CPT

## 2024-07-31 PROCEDURE — 82728 ASSAY OF FERRITIN: CPT | Mod: ZL

## 2024-07-31 ASSESSMENT — PAIN SCALES - GENERAL: PAINLEVEL: EXTREME PAIN (8)

## 2024-07-31 NOTE — NURSING NOTE
"Chief Complaint   Patient presents with    RECHECK     Suture removal    Patient presents to the clinic today for a recheck for suture removal     Initial There were no vitals taken for this visit. Estimated body mass index is 25.7 kg/m  as calculated from the following:    Height as of 2/22/23: 1.753 m (5' 9\").    Weight as of 2/22/23: 78.9 kg (174 lb).  Meds Reconciled: complete      Lali Smith LPN,LPN on 7/31/2024 at 1:38 PM  Ext. [phone]        Lali Smith LPN  "

## 2024-07-31 NOTE — PATIENT INSTRUCTIONS
Patient Education   Preventive Care Advice   This is general advice given by our system to help you stay healthy. However, your care team may have specific advice just for you. Please talk to your care team about your preventive care needs.  Nutrition  Eat 5 or more servings of fruits and vegetables each day.  Try wheat bread, brown rice and whole grain pasta (instead of white bread, rice, and pasta).  Get enough calcium and vitamin D. Check the label on foods and aim for 100% of the RDA (recommended daily allowance).  Lifestyle  Exercise at least 150 minutes each week  (30 minutes a day, 5 days a week).  Do muscle strengthening activities 2 days a week. These help control your weight and prevent disease.  No smoking.  Wear sunscreen to prevent skin cancer.  Have a dental exam and cleaning every 6 months.  Yearly exams  See your health care team every year to talk about:  Any changes in your health.  Any medicines your care team has prescribed.  Preventive care, family planning, and ways to prevent chronic diseases.  Shots (vaccines)   HPV shots (up to age 26), if you've never had them before.  Hepatitis B shots (up to age 59), if you've never had them before.  COVID-19 shot: Get this shot when it's due.  Flu shot: Get a flu shot every year.  Tetanus shot: Get a tetanus shot every 10 years.  Pneumococcal, hepatitis A, and RSV shots: Ask your care team if you need these based on your risk.  Shingles shot (for age 50 and up)  General health tests  Diabetes screening:  Starting at age 35, Get screened for diabetes at least every 3 years.  If you are younger than age 35, ask your care team if you should be screened for diabetes.  Cholesterol test: At age 39, start having a cholesterol test every 5 years, or more often if advised.  Bone density scan (DEXA): At age 50, ask your care team if you should have this scan for osteoporosis (brittle bones).  Hepatitis C: Get tested at least once in your life.  STIs (sexually  transmitted infections)  Before age 24: Ask your care team if you should be screened for STIs.  After age 24: Get screened for STIs if you're at risk. You are at risk for STIs (including HIV) if:  You are sexually active with more than one person.  You don't use condoms every time.  You or a partner was diagnosed with a sexually transmitted infection.  If you are at risk for HIV, ask about PrEP medicine to prevent HIV.  Get tested for HIV at least once in your life, whether you are at risk for HIV or not.  Cancer screening tests  Cervical cancer screening: If you have a cervix, begin getting regular cervical cancer screening tests starting at age 21.  Breast cancer scan (mammogram): If you've ever had breasts, begin having regular mammograms starting at age 40. This is a scan to check for breast cancer.  Colon cancer screening: It is important to start screening for colon cancer at age 45.  Have a colonoscopy test every 10 years (or more often if you're at risk) Or, ask your provider about stool tests like a FIT test every year or Cologuard test every 3 years.  To learn more about your testing options, visit:   .  For help making a decision, visit:   https://bit.ly/vr14284.  Prostate cancer screening test: If you have a prostate, ask your care team if a prostate cancer screening test (PSA) at age 55 is right for you.  Lung cancer screening: If you are a current or former smoker ages 50 to 80, ask your care team if ongoing lung cancer screenings are right for you.  For informational purposes only. Not to replace the advice of your health care provider. Copyright   2023 [employer]. All rights reserved. Clinically reviewed by the [facility] Transitions Program. [employer] 122207 - REV 01/24.

## 2024-07-31 NOTE — PROGRESS NOTES
Assessment & Plan     ICD-10-CM    1. Routine general medical examination at a health care facility  Z00.00 CBC and Differential     CBC and Differential      2. Status post discectomy  Z98.890 REMOVAL OF SUTURES      3. Spinal stenosis of lumbar region with neurogenic claudication  M48.062       4. Elevated platelet count  R79.89 Ferritin     Lab Blood Morphology Pathologist Review     CBC and Differential      5. Mixed hyperlipidemia  E78.2 Lipid Panel     Lipid Panel      6. Need for hepatitis C screening test  Z11.59 Hepatitis C Screen Reflex to HCV RNA Quant and Genotype     Hepatitis C Screen Reflex to HCV RNA Quant and Genotype      7. Screening for HIV (human immunodeficiency virus)  Z11.4 HIV Antigen Antibody Combo     HIV Antigen Antibody Combo      8. Snoring  R06.83 Adult Sleep Eval & Management  Referral      9. Colon cancer screening  Z12.11 Colonoscopy Screening  Referral           HYPERTENSION -blood pressure slightly elevated today.  Instructed patient to monitor blood pressures at home and if he is greater than 140/92 notify me and we will consider antihypertensive medication.     MIXED HYPERLIPIDEMIA.  Ongoing. LDL is at goal: No. Triglycerides are at goal: Yes.  Hopefully lifestyle modifications will improve cholesterol levels, otherwise will consider additional medication dose adjustments or medication changes.  ASCVD risk score is low at 3.2%.  Recommend fish oil/omega-3.    Status post lumbar discectomy: Patient is doing well postoperatively, we will be proceeding with physical therapy soon.  Suture removal was performed today without any difficulties.    Thrombocythemia noted on blood work today: Suspect this is possibly inflammation related due to recent surgery.  Regardless, we will proceed with peripheral blood smear for further workup.  Consider repeating CBC in 2 to 3 months.    Sleep Apnea concerns due to: snoring.  - SLEEP STUDY was ordered -- they will call with  date/time of appointment.    After sleep study is completed, patient will need to schedule clinic follow-up appointment with the sleep specialist to discuss results and treatment options if sleep apnea is noted.      Obstructive Sleep Apnea:  Risks of untreated sleep apnea are well documented, including but not limited to, the inability to reach restorative sleep leading to daytime somnolence, fatigue, irritability and poor work performance, risk of motor vehicle accidents, depression, memory issues, waking headaches, impotence, and increased nocturia.  More serious risks include concerns with medication/narcotic use and surgery related to the use of anesthesia, coronary artery disease, heart failure, heart attack, stroke and sudden death.    Achieving a healthy weight, adhering to a healthy diet, and exercise are very important in the treatment of sleep apnea.    Clinical evidence shows CPAP to be the treatment of choice for obstructive sleep apnea. However, if CPAP is not tolerated after reasonable attempts at treatment, surgical intervention may be necessary.       Vaccine counseling completed.  Encourage routine / annual vaccinations.                  Return in about 53 weeks (around 8/6/2025) for Annual Wellness Visit.      JOE Rice [facility]    Review of Systems      Subjective   Salomón is a 49 year old, presenting for the following health issues:  RECHECK (Suture removal )    Having pain down left hip area- burning in his leg-- x-rays will be done in 6 weeks. Physical therapy to follow.           History of Present Illness       Back Pain:  He presents for follow up of back pain. Patient's back pain is a new problem.    Original cause of back pain: lifting  First noticed back pain: more than 1 month ago  Patient feels back pain: constantlyLocation of back pain:  Left hip  Description of back pain: burning  Back pain spreads: left thigh    Since patient first noticed  back pain, pain is: rapidly worsening  Does back pain interfere with his job:  Yes  On a scale of 1-10 (10 being the worst), patient describes pain as:  8  What makes back pain worse: bending, coughing, certain positions, lying down, sitting, standing, twisting and other   Acupuncture: not tried  Acetaminophen: helpful  Activity or exercise: not helpful  Chiropractor:  Not helpful  Cold: helpful  Heat: not helpful  Massage: not helpful  Muscle relaxants: helpful  NSAIDS: not tried  Opioids: helpful  Physical Therapy: helpful  Rest: helpful  Steroid Injection: not helpful  Stretching: helpful  Surgery: helpful  TENS unit: not helpful  Topical pain relievers: not helpful  Other healthcare providers patient is seeing for back pain: Other    He eats 4 or more servings of fruits and vegetables daily.He consumes 0 sweetened beverage(s) daily.He exercises with enough effort to increase his heart rate 20 to 29 minutes per day.  He exercises with enough effort to increase his heart rate 3 or less days per week.   He is taking medications regularly.  He is not taking prescribed medications regularly due to Not applicable.  Healthy Habits:     Getting at least 3 servings of Calcium per day:  Yes    Bi-annual eye exam:  Yes    Dental care twice a year:  Yes    Sleep apnea or symptoms of sleep apnea:  Excessive snoring    Diet:  Regular (no restrictions)    Frequency of exercise:  4-5 days/week    Duration of exercise:  45-60 minutes    Taking medications regularly:  0    Barriers to taking medications:  Not applicable    Medication side effects:  Not applicable    Additional concerns today:  Yes                 Review of Systems  Constitutional, HEENT, cardiovascular, pulmonary, gi and gu systems are negative, except as otherwise noted.      Objective    BP (!) 142/92 (BP Location: Right arm, Patient Position: Sitting, Cuff Size: Adult Regular)   Pulse 82   Temp 97.7  F (36.5  C) (Temporal)   Resp 16   Wt 79.8 kg (176 lb)    SpO2 98%   BMI 25.99 kg/m    Body mass index is 25.99 kg/m .  Physical Exam   GENERAL: alert and no distress  NECK: no adenopathy, no asymmetry, masses, or scars  RESP: lungs clear to auscultation - no rales, rhonchi or wheezes  CV: regular rate and rhythm, normal S1 S2, no S3 or S4, no murmur, click or rub, no peripheral edema  ABDOMEN: soft, nontender, no hepatosplenomegaly, no masses and bowel sounds normal  ORTHO: Lumber/Thoracic Spine Exam: Incision clean, dry, intact- sutures removed. Patient in back brace      Results for orders placed or performed in visit on 07/31/24   Lipid Panel     Status: Abnormal   Result Value Ref Range    Cholesterol 191 <200 mg/dL    Triglycerides 140 <150 mg/dL    Direct Measure HDL 61 >=40 mg/dL    LDL Cholesterol Calculated 102 (H) <=100 mg/dL    Non HDL Cholesterol 130 (H) <130 mg/dL    Patient Fasting > 8hrs? Unknown     Narrative    Cholesterol  Desirable:  <200 mg/dL    Triglycerides  Normal:  Less than 150 mg/dL  Borderline High:  150-199 mg/dL  High:  200-499 mg/dL  Very High:  Greater than or equal to 500 mg/dL    Direct Measure HDL  Female:  Greater than or equal to 50 mg/dL   Male:  Greater than or equal to 40 mg/dL    LDL Cholesterol  Desirable:  <100mg/dL  Above Desirable:  100-129 mg/dL   Borderline High:  130-159 mg/dL   High:  160-189 mg/dL   Very High:  >= 190 mg/dL    Non HDL Cholesterol  Desirable:  130 mg/dL  Above Desirable:  130-159 mg/dL  Borderline High:  160-189 mg/dL  High:  190-219 mg/dL  Very High:  Greater than or equal to 220 mg/dL   CBC with platelets and differential     Status: Abnormal   Result Value Ref Range    WBC Count 7.2 4.0 - 11.0 10e3/uL    RBC Count 4.61 4.40 - 5.90 10e6/uL    Hemoglobin 13.9 13.3 - 17.7 g/dL    Hematocrit 41.6 40.0 - 53.0 %    MCV 90 78 - 100 fL    MCH 30.2 26.5 - 33.0 pg    MCHC 33.4 31.5 - 36.5 g/dL    RDW 12.4 10.0 - 15.0 %    Platelet Count 605 (H) 150 - 450 10e3/uL    % Neutrophils 67 %    % Lymphocytes 23 %    %  Monocytes 6 %    % Eosinophils 3 %    % Basophils 1 %    % Immature Granulocytes 0 %    NRBCs per 100 WBC 0 <1 /100    Absolute Neutrophils 4.8 1.6 - 8.3 10e3/uL    Absolute Lymphocytes 1.7 0.8 - 5.3 10e3/uL    Absolute Monocytes 0.4 0.0 - 1.3 10e3/uL    Absolute Eosinophils 0.2 0.0 - 0.7 10e3/uL    Absolute Basophils 0.1 0.0 - 0.2 10e3/uL    Absolute Immature Granulocytes 0.0 <=0.4 10e3/uL    Absolute NRBCs 0.0 10e3/uL   CBC and Differential     Status: Abnormal    Narrative    The following orders were created for panel order CBC and Differential.  Procedure                               Abnormality         Status                     ---------                               -----------         ------                     CBC with platelets and d...[[id number]]  Abnormal            Final result                 Please view results for these tests on the individual orders.   Lab Blood Morphology Pathologist Review     Status: None (In process)    Narrative    The following orders were created for panel order Lab Blood Morphology Pathologist Review.  Procedure                               Abnormality         Status                     ---------                               -----------         ------                     Bld morphology pathology...[[id number]]                                                 CBC with platelets and d...[[id number]]                      In process                 Reticulocyte count[[id number]]                               In process                 Morphology Tracking[[id number]]                              In process                   Please view results for these tests on the individual orders.               Signed Electronically by: JOE Rice CNP

## 2024-08-01 PROBLEM — M48.062 SPINAL STENOSIS OF LUMBAR REGION WITH NEUROGENIC CLAUDICATION: Status: ACTIVE | Noted: 2024-08-01

## 2024-08-01 LAB
BASOPHILS # BLD AUTO: 0.1 10E3/UL (ref 0–0.2)
BASOPHILS NFR BLD AUTO: 1 %
EOSINOPHIL # BLD AUTO: 0.2 10E3/UL (ref 0–0.7)
EOSINOPHIL NFR BLD AUTO: 3 %
ERYTHROCYTE [DISTWIDTH] IN BLOOD BY AUTOMATED COUNT: 12.6 % (ref 10–15)
FERRITIN SERPL-MCNC: 198 NG/ML (ref 31–409)
HCT VFR BLD AUTO: 43 % (ref 40–53)
HCV AB SERPL QL IA: NONREACTIVE
HGB BLD-MCNC: 13.9 G/DL (ref 13.3–17.7)
HIV 1+2 AB+HIV1 P24 AG SERPL QL IA: NONREACTIVE
IMM GRANULOCYTES # BLD: 0 10E3/UL
IMM GRANULOCYTES NFR BLD: 0 %
LYMPHOCYTES # BLD AUTO: 1.7 10E3/UL (ref 0.8–5.3)
LYMPHOCYTES NFR BLD AUTO: 24 %
MCH RBC QN AUTO: 29.8 PG (ref 26.5–33)
MCHC RBC AUTO-ENTMCNC: 32.3 G/DL (ref 31.5–36.5)
MCV RBC AUTO: 92 FL (ref 78–100)
MONOCYTES # BLD AUTO: 0.5 10E3/UL (ref 0–1.3)
MONOCYTES NFR BLD AUTO: 7 %
NEUTROPHILS # BLD AUTO: 4.5 10E3/UL (ref 1.6–8.3)
NEUTROPHILS NFR BLD AUTO: 65 %
NRBC # BLD AUTO: 0 10E3/UL
NRBC BLD AUTO-RTO: 0 /100
PLATELET # BLD AUTO: 674 10E3/UL (ref 150–450)
RBC # BLD AUTO: 4.66 10E6/UL (ref 4.4–5.9)
RETICS # AUTO: 0.09 10E6/UL (ref 0.03–0.1)
RETICS/RBC NFR AUTO: 1.8 % (ref 0.5–2)
WBC # BLD AUTO: 6.9 10E3/UL (ref 4–11)

## 2024-08-01 PROCEDURE — 81338 MPL GENE COMMON VARIANTS: CPT

## 2024-08-01 PROCEDURE — 81270 JAK2 GENE: CPT

## 2024-08-01 PROCEDURE — 81219 CALR GENE COM VARIANTS: CPT

## 2024-08-01 PROCEDURE — 36415 COLL VENOUS BLD VENIPUNCTURE: CPT | Mod: ZL

## 2024-08-01 PROCEDURE — 81279 JAK2 GENE TRGT SEQUENCE ALYS: CPT

## 2024-08-07 DIAGNOSIS — Z12.11 ENCOUNTER FOR SCREENING COLONOSCOPY: Primary | ICD-10-CM

## 2024-08-07 NOTE — TELEPHONE ENCOUNTER
Screening Questions for the Scheduling of Screening Colonoscopies   (If Colonoscopy is diagnostic, Provider should review the chart before scheduling.)  Are you younger than 45 or older than 80?  NO  Do you take aspirin or fish oil?  NO (if yes, tell patient to stop 1 week prior to Colonoscopy)  Do you take warfarin (Coumadin), clopidogrel (Plavix), apixaban (Eliquis), dabigatram (Pradaxa), rivaroxaban (Xarelto) or any blood thinner? NO  Do you take semaglutide (Ozempic or Wegovy), tirzepatide (Mounjaro or Zepbound), liraglutide (Victoza), or dulaglutide (Trulicity)? NO  Do you use oxygen or a CPAP at home?  NO  Do you have kidney disease? NO  Are you on dialysis? NO  Have you had a stroke or heart attack in the last year? NO  Have you had a stent in your heart or any blood vessel in the last year? NO  Have you had a transplant of any organ? NO  Have you had a colonoscopy or upper endoscopy (EGD) before? NO  Date of scheduled Colonoscopy. 10/28/2024  Provider FREDDIE  Pharmacy [facility]

## 2024-08-09 LAB — PATH REPORT.FINAL DX SPEC: NORMAL

## 2024-08-19 RX ORDER — BISACODYL 5 MG/1
TABLET, DELAYED RELEASE ORAL
Qty: 2 TABLET | Refills: 0 | Status: SHIPPED | OUTPATIENT
Start: 2024-10-21

## 2024-08-19 RX ORDER — POLYETHYLENE GLYCOL 3350, SODIUM CHLORIDE, SODIUM BICARBONATE, POTASSIUM CHLORIDE 420; 11.2; 5.72; 1.48 G/4L; G/4L; G/4L; G/4L
4000 POWDER, FOR SOLUTION ORAL ONCE
Qty: 4000 ML | Refills: 0 | Status: SHIPPED | OUTPATIENT
Start: 2024-10-21 | End: 2024-10-21

## 2024-08-22 ENCOUNTER — HOSPITAL ENCOUNTER (OUTPATIENT)
Dept: GENERAL RADIOLOGY | Facility: OTHER | Age: 49
Discharge: HOME | End: 2024-08-22
Attending: ORTHOPAEDIC SURGERY | Admitting: ORTHOPAEDIC SURGERY
Payer: COMMERCIAL

## 2024-08-22 DIAGNOSIS — Z98.1 STATUS POST LUMBAR SPINAL FUSION: ICD-10-CM

## 2024-08-22 PROCEDURE — 72100 X-RAY EXAM L-S SPINE 2/3 VWS: CPT

## 2024-09-23 ENCOUNTER — OFFICE VISIT (OUTPATIENT)
Dept: INTERNAL MEDICINE | Facility: OTHER | Age: 49
End: 2024-09-23
Payer: COMMERCIAL

## 2024-09-23 VITALS
OXYGEN SATURATION: 100 % | WEIGHT: 173.2 LBS | HEART RATE: 80 BPM | DIASTOLIC BLOOD PRESSURE: 100 MMHG | RESPIRATION RATE: 16 BRPM | SYSTOLIC BLOOD PRESSURE: 150 MMHG | BODY MASS INDEX: 25.58 KG/M2 | TEMPERATURE: 97.6 F

## 2024-09-23 DIAGNOSIS — Z98.890 STATUS POST DISCECTOMY: ICD-10-CM

## 2024-09-23 DIAGNOSIS — R79.89 ELEVATED PLATELET COUNT: ICD-10-CM

## 2024-09-23 DIAGNOSIS — I10 BENIGN ESSENTIAL HYPERTENSION: Primary | ICD-10-CM

## 2024-09-23 DIAGNOSIS — L57.0 ACTINIC KERATOSIS: ICD-10-CM

## 2024-09-23 DIAGNOSIS — L98.9 BENIGN SKIN LESION: ICD-10-CM

## 2024-09-23 LAB
ALBUMIN SERPL BCG-MCNC: 4.9 G/DL (ref 3.5–5.2)
ALP SERPL-CCNC: 100 U/L (ref 40–150)
ALT SERPL W P-5'-P-CCNC: 31 U/L (ref 0–70)
ANION GAP SERPL CALCULATED.3IONS-SCNC: 10 MMOL/L (ref 7–15)
AST SERPL W P-5'-P-CCNC: 25 U/L (ref 0–45)
BASOPHILS # BLD AUTO: 0.1 10E3/UL (ref 0–0.2)
BASOPHILS NFR BLD AUTO: 1 %
BILIRUB SERPL-MCNC: 0.4 MG/DL
BUN SERPL-MCNC: 15.6 MG/DL (ref 6–20)
CALCIUM SERPL-MCNC: 10 MG/DL (ref 8.8–10.4)
CHLORIDE SERPL-SCNC: 100 MMOL/L (ref 98–107)
CREAT SERPL-MCNC: 0.85 MG/DL (ref 0.67–1.17)
EGFRCR SERPLBLD CKD-EPI 2021: >90 ML/MIN/1.73M2
EOSINOPHIL # BLD AUTO: 0.2 10E3/UL (ref 0–0.7)
EOSINOPHIL NFR BLD AUTO: 5 %
ERYTHROCYTE [DISTWIDTH] IN BLOOD BY AUTOMATED COUNT: 13 % (ref 10–15)
GLUCOSE SERPL-MCNC: 111 MG/DL (ref 70–99)
HCO3 SERPL-SCNC: 27 MMOL/L (ref 22–29)
HCT VFR BLD AUTO: 49.2 % (ref 40–53)
HGB BLD-MCNC: 16.4 G/DL (ref 13.3–17.7)
IMM GRANULOCYTES # BLD: 0 10E3/UL
IMM GRANULOCYTES NFR BLD: 0 %
LYMPHOCYTES # BLD AUTO: 1.6 10E3/UL (ref 0.8–5.3)
LYMPHOCYTES NFR BLD AUTO: 32 %
MCH RBC QN AUTO: 29.4 PG (ref 26.5–33)
MCHC RBC AUTO-ENTMCNC: 33.3 G/DL (ref 31.5–36.5)
MCV RBC AUTO: 88 FL (ref 78–100)
MONOCYTES # BLD AUTO: 0.4 10E3/UL (ref 0–1.3)
MONOCYTES NFR BLD AUTO: 9 %
NEUTROPHILS # BLD AUTO: 2.6 10E3/UL (ref 1.6–8.3)
NEUTROPHILS NFR BLD AUTO: 53 %
NRBC # BLD AUTO: 0 10E3/UL
NRBC BLD AUTO-RTO: 0 /100
PLATELET # BLD AUTO: 460 10E3/UL (ref 150–450)
POTASSIUM SERPL-SCNC: 4.3 MMOL/L (ref 3.4–5.3)
PROT SERPL-MCNC: 7.7 G/DL (ref 6.4–8.3)
RBC # BLD AUTO: 5.58 10E6/UL (ref 4.4–5.9)
SODIUM SERPL-SCNC: 137 MMOL/L (ref 135–145)
WBC # BLD AUTO: 5 10E3/UL (ref 4–11)

## 2024-09-23 PROCEDURE — 93000 ELECTROCARDIOGRAM COMPLETE: CPT | Performed by: STUDENT IN AN ORGANIZED HEALTH CARE EDUCATION/TRAINING PROGRAM

## 2024-09-23 PROCEDURE — 36415 COLL VENOUS BLD VENIPUNCTURE: CPT | Mod: ZL

## 2024-09-23 PROCEDURE — 85004 AUTOMATED DIFF WBC COUNT: CPT | Mod: ZL

## 2024-09-23 PROCEDURE — 99214 OFFICE O/P EST MOD 30 MIN: CPT

## 2024-09-23 PROCEDURE — 17110 DESTRUCTION B9 LES UP TO 14: CPT

## 2024-09-23 PROCEDURE — 17000 DESTRUCT PREMALG LESION: CPT

## 2024-09-23 PROCEDURE — 82040 ASSAY OF SERUM ALBUMIN: CPT | Mod: ZL

## 2024-09-23 PROCEDURE — G2211 COMPLEX E/M VISIT ADD ON: HCPCS

## 2024-09-23 PROCEDURE — 82374 ASSAY BLOOD CARBON DIOXIDE: CPT | Mod: ZL

## 2024-09-23 RX ORDER — GABAPENTIN 300 MG/1
900 CAPSULE ORAL
COMMUNITY
Start: 2024-08-19

## 2024-09-23 RX ORDER — ACETAMINOPHEN 500 MG
1000 TABLET ORAL
COMMUNITY
Start: 2024-07-17

## 2024-09-23 RX ORDER — AMLODIPINE BESYLATE 5 MG/1
5 TABLET ORAL DAILY
Qty: 90 TABLET | Refills: 0 | Status: SHIPPED | OUTPATIENT
Start: 2024-09-23

## 2024-09-23 RX ORDER — SILDENAFIL 50 MG/1
TABLET, FILM COATED ORAL
COMMUNITY

## 2024-09-23 ASSESSMENT — ENCOUNTER SYMPTOMS
PALPITATIONS: 0
BACK PAIN: 1
SHORTNESS OF BREATH: 0
ROS SKIN COMMENTS: +SKIN LESIONS

## 2024-09-23 ASSESSMENT — PAIN SCALES - GENERAL: PAINLEVEL: MILD PAIN (2)

## 2024-09-23 NOTE — PATIENT INSTRUCTIONS
What to Expect Following Cryosurgery (Liquid Nitrogen)   What is Cryosurgery?   Cryosurgery is a technique for removing skin lesions that primarily involve the surface of the skin, such as warts, seborrheic keratosis, or actinic keratosis. It is a quick method of removing the lesion with minimal scarring.   The liquid nitrogen needs to be applied long enough tofreeze the affected skin. By freezing the skin, a blister is created underneath the lesion. Ideally, as the new skin forms underneath the blister, the abnormal skin on the roof of the blister peels off. Occasionally, if thelesion is very thick (such as a large wart), only the surface is blistered off. The base or residual lesion may need to be frozen at another visit.   It takes about one to two weeks for the scab to fall off, which is whenthe new layer of skin has formed under the blister. Areas of thinner skin, such as the face, may heal a little faster.      What to Expect Over the Next Few Weeks   ? During Treatment - Area beingtreated will sting, burn, and then possibly itch.   ? Immediately After Treatment - Area will be red, sore, and swollen.   ? Next Day - Blister or blood blister has formed, tenderness starts to subside. Apply aBand-Aid if   necessary.   ? 7 Days - Surface is dark red/brown and scab-like. Apply Vaseline  or an antibacterial ointment, such as Polysporin , if necessary.   ? 2 to 4 Weeks - The surface startsto peel off. This may be encouraged gently during bathing, when the scab is softened.   ? No makeup shouldbe applied until area is fully healed.      Howto Take Care of the Skin after Cryosurgery   ? ABand-Aid can be used for larger blisters or blisters in areas that are more likely to be traumatized -such as fingers and toes. If the area becomes dry or crusted, an ointment (Vaseline , Bacitracin , or Polysporin ) can also be applied.   ? Cleanse area with a mild cleanser and cool water.   ? Pat the area dry with a  lint-free cloth and apply an ointment (Vaseline , Bacitracin , or Polysporin ).   ? Avoid glycolic acids, Vitamin C, scrubs, Tretinoin (Retin-A), and Retinol creams for 7 to 10 days.   ? If approved by your Provider, you may bathe, swim, exercise, and otherwise follow all of your normalactivities.   ? The area may get wet while bathing, but swimming or hot tub use should be avoided for oneweek following a treatment or while the skin is open.   ? Within 24 hours, you can expect the area to beswollen and/or blistered. The blister may not be visible to the naked eye.   ? Within one week, theswelling goes down. The top becomes dark red and scab-like. The scab will loosen over the next weeks, and should fall off within one month.      Adverse Effects   ? The most common adverse effects are pain, swelling/blistering, potential for infection, and pale discoloration of the skin after it heals.      Blisters      ? Anytime a blister surfaces, whether from ill-fitting shoes, an oven burn, or liquid nitrogen cryosurgery, it will be a bit painful. For most patients, the pain is a temporary stingwith some discomfort periodically over the next day as the blister forms.   ? The goal is to achieve ablister. This means, most commonly, patients will have a blister form following treatment. Sometimes, the blister is so thin that it can't be seen and may have minimal swelling. Occasionally, a blood blister forms that canbe quite dramatic but is harmless.   ? Rarely, the blister may become infected. When this happens, theblister becomes unusually tender, the fluid becomes cloudy, and the redness around it becomes more extensive (and may even form streaks). If this happens, contact our office.   ? Some lesions, especially those on theface, may leave a slight pale discoloration.   ? True scarring,involving deeper layers of the skin is unlikely.     PAIN

## 2024-09-23 NOTE — NURSING NOTE
"Chief Complaint   Patient presents with    Derm Problem     Skin check    Patient presents to the clinic today for a derm problem with a skin check     Initial There were no vitals taken for this visit. Estimated body mass index is 25.99 kg/m  as calculated from the following:    Height as of 2/22/23: 1.753 m (5' 9\").    Weight as of 7/31/24: 79.8 kg (176 lb).  Meds Reconciled: complete      Lali Smith LPN,LPN on 9/23/2024 at 7:46 AM  Ext. [phone]        Lali Smith LPN  "

## 2024-09-23 NOTE — PATIENT INSTRUCTIONS
Continue to check blood pressures at home if her blood pressures are greater than 140/90 please start amlodipine 5 mg daily.          What to Expect Following Cryosurgery (Liquid Nitrogen)   What is Cryosurgery?   Cryosurgery is a technique for removing skin lesions that primarily involve the surface of the skin, such as warts, seborrheic keratosis, or actinic keratosis. It is a quick method of removing the lesion with minimal scarring.   The liquid nitrogen needs to be applied long enough tofreeze the affected skin. By freezing the skin, a blister is created underneath the lesion. Ideally, as the new skin forms underneath the blister, the abnormal skin on the roof of the blister peels off. Occasionally, if thelesion is very thick (such as a large wart), only the surface is blistered off. The base or residual lesion may need to be frozen at another visit.   It takes about one to two weeks for the scab to fall off, which is whenthe new layer of skin has formed under the blister. Areas of thinner skin, such as the face, may heal a little faster.      What to Expect Over the Next Few Weeks   ? During Treatment - Area beingtreated will sting, burn, and then possibly itch.   ? Immediately After Treatment - Area will be red, sore, and swollen.   ? Next Day - Blister or blood blister has formed, tenderness starts to subside. Apply aBand-Aid if   necessary.   ? 7 Days - Surface is dark red/brown and scab-like. Apply Vaseline  or an antibacterial ointment, such as Polysporin , if necessary.   ? 2 to 4 Weeks - The surface startsto peel off. This may be encouraged gently during bathing, when the scab is softened.   ? No makeup shouldbe applied until area is fully healed.      Howto Take Care of the Skin after Cryosurgery   ? ABand-Aid can be used for larger blisters or blisters in areas that are more likely to be traumatized -such as fingers and toes. If the area becomes dry or crusted, an ointment (Vaseline , Bacitracin ,  or Polysporin ) can also be applied.   ? Cleanse area with a mild cleanser and cool water.   ? Pat the area dry with a lint-free cloth and apply an ointment (Vaseline , Bacitracin , or Polysporin ).   ? Avoid glycolic acids, Vitamin C, scrubs, Tretinoin (Retin-A), and Retinol creams for 7 to 10 days.   ? If approved by your Provider, you may bathe, swim, exercise, and otherwise follow all of your normalactivities.   ? The area may get wet while bathing, but swimming or hot tub use should be avoided for oneweek following a treatment or while the skin is open.   ? Within 24 hours, you can expect the area to beswollen and/or blistered. The blister may not be visible to the naked eye.   ? Within one week, theswelling goes down. The top becomes dark red and scab-like. The scab will loosen over the next weeks, and should fall off within one month.      Adverse Effects   ? The most common adverse effects are pain, swelling/blistering, potential for infection, and pale discoloration of the skin after it heals.      Blisters      ? Anytime a blister surfaces, whether from ill-fitting shoes, an oven burn, or liquid nitrogen cryosurgery, it will be a bit painful. For most patients, the pain is a temporary stingwith some discomfort periodically over the next day as the blister forms.   ? The goal is to achieve ablister. This means, most commonly, patients will have a blister form following treatment. Sometimes, the blister is so thin that it can't be seen and may have minimal swelling. Occasionally, a blood blister forms that canbe quite dramatic but is harmless.   ? Rarely, the blister may become infected. When this happens, theblister becomes unusually tender, the fluid becomes cloudy, and the redness around it becomes more extensive (and may even form streaks). If this happens, contact our office.   ? Some lesions, especially those on theface, may leave a slight pale discoloration.   ? True scarring,involving deeper layers  of the skin is unlikely.

## 2024-09-23 NOTE — PROGRESS NOTES
Assessment & Plan     ICD-10-CM    1. Benign essential hypertension  I10 amLODIPine (NORVASC) 5 MG tablet     EKG 12-lead complete w/read - Clinics      2. Status post discectomy  Z98.890 Physical Therapy  Referral     Comprehensive Metabolic Panel     Comprehensive Metabolic Panel      3. Elevated platelet count  R79.89 CBC and Differential     Comprehensive Metabolic Panel     CBC and Differential     Comprehensive Metabolic Panel      4. Actinic keratosis  L57.0 DESTRUCT PREMALIGNANT LESION, FIRST      5. Benign skin lesion  L98.9 DESTRUCT BENIGN LESION, UP TO 14           Actinic keratosis on right nostril: Cryotherapy performed for this today.    Benign skin lesions/tags: Cryotherapy performed for this today.    PROCEDURE:   Reviewed risks and benefits of cryotherapy.After informed consent was obtained, patient elected to proceed. These 3 lesions were treated today.   Performed cryotherapy to #3 lesions x 3 rounds of 5-10 second freeze/thaw cycles.   Tolerated well. No obvious complications.   Return for signs of infection.    The patient and I reviewed safe sun practices today: the importance of staying out of the sun;especially between 10AM-2PM, wearing sun screen SPF > 30, and protective clothing.   We also discussed the ABC's of skin cancers including: changes in size, uniformity, borders, coloration, bleeding, or any irregularityor changes. If these occur, seek medical attention.   The patient should have a complete skin exam by a medical professional every 6-12 months, and any questionable/suspicious, or changing lesions should be removed.       HYPERTENSION -Blood pressures greater than 140/90- recommend antihypertensive medication. Will start patient on 5 mg amlodipine daily.  Discussed side effects of this medication.  He will monitor blood pressures at home and will reach out if his blood pressures are still greater than 140/90 or if he has side effects to the medication.  EKG today shows  normal sinus rhythm.    Thrombocytosis: Initially this was thought to be reactive after surgery.  However, it continues to persist.  Reviewed past peripheral blood smear which did not show any obvious abnormality.  Patient is asymptomatic, we will refer him to hematology for further workup.      The longitudinal plan of care for the diagnosis(es)/condition(s) as documented were addressed during this visit. Due to the added complexity in care, I will continue to support Salomón in the subsequent management and with ongoing continuity of care.                No follow-ups on file.      JOE Rice [facility]    Review of Systems   Respiratory:  Negative for shortness of breath.    Cardiovascular:  Negative for palpitations and peripheral edema.   Musculoskeletal:  Positive for back pain.   Skin:         +skin lesions               Subjective   Salomón is a 49 year old, presenting for the following health issues:    Derm Problem (Skin check )    Patient presents to clinic to have several lesions evaluated, continues to have lower back pain status post discectomy.  Continues to follow through the [facility]. Would like physical therapy referral. Follow up on blood work.       History of Present Illness       Reason for visit:  Skin check   He is taking medications regularly.                     Objective    BP (!) 150/100 (BP Location: Right arm, Patient Position: Sitting, Cuff Size: Adult Regular)   Pulse 80   Temp 97.6  F (36.4  C) (Temporal)   Resp 16   Wt 78.6 kg (173 lb 3.2 oz)   SpO2 100%   BMI 25.58 kg/m    Body mass index is 25.58 kg/m .  Physical Exam  Constitutional:       General: He is not in acute distress.     Appearance: He is well-developed.   HENT:      Head: Normocephalic and atraumatic.        Nose: Nose normal.      Mouth/Throat:      Pharynx: No oropharyngeal exudate.   Eyes:      General: No scleral icterus.     Conjunctiva/sclera: Conjunctivae normal.       Pupils: Pupils are equal, round, and reactive to light.   Neck:      Thyroid: No thyromegaly.   Cardiovascular:      Rate and Rhythm: Normal rate and regular rhythm.      Heart sounds: No murmur heard.  Pulmonary:      Effort: Pulmonary effort is normal. No respiratory distress.      Breath sounds: Normal breath sounds. No wheezing.   Musculoskeletal:         General: No deformity.      Cervical back: Normal range of motion and neck supple.      Lumbar back: Tenderness present. Decreased range of motion.        Back:    Skin:     General: Skin is warm and dry.      Findings: No rash.   Neurological:      Mental Status: He is alert and oriented to person, place, and time.      Cranial Nerves: No cranial nerve deficit.      Coordination: Coordination normal.   Psychiatric:         Behavior: Behavior normal.         Thought Content: Thought content normal.         Judgment: Judgment normal.          Results for orders placed or performed in visit on 09/23/24   CBC with platelets and differential     Status: Abnormal   Result Value Ref Range    WBC Count 5.0 4.0 - 11.0 10e3/uL    RBC Count 5.58 4.40 - 5.90 10e6/uL    Hemoglobin 16.4 13.3 - 17.7 g/dL    Hematocrit 49.2 40.0 - 53.0 %    MCV 88 78 - 100 fL    MCH 29.4 26.5 - 33.0 pg    MCHC 33.3 31.5 - 36.5 g/dL    RDW 13.0 10.0 - 15.0 %    Platelet Count 460 (H) 150 - 450 10e3/uL    % Neutrophils 53 %    % Lymphocytes 32 %    % Monocytes 9 %    % Eosinophils 5 %    % Basophils 1 %    % Immature Granulocytes 0 %    NRBCs per 100 WBC 0 <1 /100    Absolute Neutrophils 2.6 1.6 - 8.3 10e3/uL    Absolute Lymphocytes 1.6 0.8 - 5.3 10e3/uL    Absolute Monocytes 0.4 0.0 - 1.3 10e3/uL    Absolute Eosinophils 0.2 0.0 - 0.7 10e3/uL    Absolute Basophils 0.1 0.0 - 0.2 10e3/uL    Absolute Immature Granulocytes 0.0 <=0.4 10e3/uL    Absolute NRBCs 0.0 10e3/uL   EKG 12-lead complete w/read - Clinics     Status: None (Preliminary result)   Result Value Ref Range    Systolic Blood Pressure   mmHg    Diastolic Blood Pressure  mmHg    Ventricular Rate 72 BPM    Atrial Rate 72 BPM    TN Interval 150 ms    QRS Duration 102 ms     ms    QTc 411 ms    P Axis 36 degrees    R AXIS 34 degrees    T Axis 19 degrees    Interpretation ECG       Sinus rhythm  Normal ECG  No previous ECGs available     CBC and Differential     Status: Abnormal    Narrative    The following orders were created for panel order CBC and Differential.  Procedure                               Abnormality         Status                     ---------                               -----------         ------                     CBC with platelets and d...[[id number]]  Abnormal            Final result                 Please view results for these tests on the individual orders.                   Signed Electronically by: JOE Rice CNP

## 2024-09-25 ENCOUNTER — PATIENT OUTREACH (OUTPATIENT)
Dept: ONCOLOGY | Facility: OTHER | Age: 49
End: 2024-09-25
Payer: COMMERCIAL

## 2024-09-25 NOTE — PROGRESS NOTES
[facility]: Cancer Care Initial Note                                    Discussion with Patient:                                                    Oncology/Hematology Care Coordination - Referral Review    Referred by:  Aby Robles    Diagnosis:  thrombocytosis    Most recent Imaging:      Most recent Lab:  9/23/2024    Surgery/Biopsy:      Pathology:      Outside Records:             Assessment:                                                      Initial  Current living arrangement:: I live in a private home with spouse  Equipment Currently Used at Home: none  Bed or wheelchair confined:: No  Mobility Status: Independent  Transportation means:: Accessible car  Medication adherence problem (GOAL):: No  Knowledgeable about how to use meds:: Yes  Medication side effects suspected:: No  Advanced Care Plans/Directives on file:: No        Intervention/Education provided during outreach:                                                       Scheduling will call to schedule consult        Signature:  Quyen Triplett RN

## 2024-09-26 LAB
ATRIAL RATE - MUSE: 72 BPM
DIASTOLIC BLOOD PRESSURE - MUSE: NORMAL MMHG
INTERPRETATION ECG - MUSE: NORMAL
P AXIS - MUSE: 36 DEGREES
PR INTERVAL - MUSE: 150 MS
QRS DURATION - MUSE: 102 MS
QT - MUSE: 376 MS
QTC - MUSE: 411 MS
R AXIS - MUSE: 34 DEGREES
SYSTOLIC BLOOD PRESSURE - MUSE: NORMAL MMHG
T AXIS - MUSE: 19 DEGREES
VENTRICULAR RATE- MUSE: 72 BPM

## 2024-10-08 PROBLEM — R03.0 FINDING OF ABOVE NORMAL BLOOD PRESSURE: Status: ACTIVE | Noted: 2024-07-01

## 2024-10-08 PROBLEM — F41.8 MIXED ANXIETY DEPRESSIVE DISORDER: Status: ACTIVE | Noted: 2024-07-01

## 2024-10-08 PROBLEM — Z00.00 HEALTHCARE MAINTENANCE: Status: ACTIVE | Noted: 2024-10-08

## 2024-10-08 PROBLEM — Z86.16 HISTORY OF SEVERE ACUTE RESPIRATORY SYNDROME CORONAVIRUS 2 (SARS-COV-2) DISEASE: Status: RESOLVED | Noted: 2024-07-01 | Resolved: 2024-10-08

## 2024-10-08 PROBLEM — R06.83 SNORING: Status: ACTIVE | Noted: 2024-07-01

## 2024-10-08 PROBLEM — Z87.891 PERSONAL HISTORY OF TOBACCO USE, PRESENTING HAZARDS TO HEALTH: Status: ACTIVE | Noted: 2024-07-01

## 2024-10-18 ENCOUNTER — ONCOLOGY VISIT (OUTPATIENT)
Dept: ONCOLOGY | Facility: OTHER | Age: 49
End: 2024-10-18
Payer: COMMERCIAL

## 2024-10-18 VITALS
OXYGEN SATURATION: 96 % | DIASTOLIC BLOOD PRESSURE: 100 MMHG | HEART RATE: 70 BPM | RESPIRATION RATE: 16 BRPM | SYSTOLIC BLOOD PRESSURE: 132 MMHG | BODY MASS INDEX: 26.22 KG/M2 | TEMPERATURE: 97.7 F | HEIGHT: 69 IN | WEIGHT: 177 LBS

## 2024-10-18 DIAGNOSIS — D75.839 THROMBOCYTOSIS: ICD-10-CM

## 2024-10-18 LAB
BASOPHILS # BLD AUTO: 0.1 10E3/UL (ref 0–0.2)
BASOPHILS NFR BLD AUTO: 1 %
EOSINOPHIL # BLD AUTO: 0.2 10E3/UL (ref 0–0.7)
EOSINOPHIL NFR BLD AUTO: 4 %
ERYTHROCYTE [DISTWIDTH] IN BLOOD BY AUTOMATED COUNT: 13.2 % (ref 10–15)
HCT VFR BLD AUTO: 44.4 % (ref 40–53)
HGB BLD-MCNC: 14.8 G/DL (ref 13.3–17.7)
IMM GRANULOCYTES # BLD: 0 10E3/UL
IMM GRANULOCYTES NFR BLD: 0 %
LYMPHOCYTES # BLD AUTO: 1.6 10E3/UL (ref 0.8–5.3)
LYMPHOCYTES NFR BLD AUTO: 29 %
MCH RBC QN AUTO: 29 PG (ref 26.5–33)
MCHC RBC AUTO-ENTMCNC: 33.3 G/DL (ref 31.5–36.5)
MCV RBC AUTO: 87 FL (ref 78–100)
MONOCYTES # BLD AUTO: 0.6 10E3/UL (ref 0–1.3)
MONOCYTES NFR BLD AUTO: 10 %
NEUTROPHILS # BLD AUTO: 3.2 10E3/UL (ref 1.6–8.3)
NEUTROPHILS NFR BLD AUTO: 56 %
NRBC # BLD AUTO: 0 10E3/UL
NRBC BLD AUTO-RTO: 0 /100
PLATELET # BLD AUTO: 397 10E3/UL (ref 150–450)
RBC # BLD AUTO: 5.1 10E6/UL (ref 4.4–5.9)
WBC # BLD AUTO: 5.6 10E3/UL (ref 4–11)

## 2024-10-18 PROCEDURE — 99204 OFFICE O/P NEW MOD 45 MIN: CPT | Performed by: INTERNAL MEDICINE

## 2024-10-18 PROCEDURE — 85004 AUTOMATED DIFF WBC COUNT: CPT | Mod: ZL | Performed by: INTERNAL MEDICINE

## 2024-10-18 PROCEDURE — 36415 COLL VENOUS BLD VENIPUNCTURE: CPT | Mod: ZL | Performed by: INTERNAL MEDICINE

## 2024-10-18 RX ORDER — BUPROPION HYDROCHLORIDE 300 MG/1
300 TABLET ORAL EVERY MORNING
COMMUNITY
Start: 2024-08-14

## 2024-10-18 ASSESSMENT — PAIN SCALES - GENERAL: PAINLEVEL: NO PAIN (0)

## 2024-10-18 NOTE — PROGRESS NOTES
HEMATOLOGY CONSULT  NOTE  Oct 18, 2024    Reason for consult: Thrombocytosis    HISTORY OF PRESENT ILLNESS  Salomón Edwards is a 49 year old male with PMH as stated below who is seen in the hematology clinic for thrombocytosis.    History in short is as follows:    Mr Edwards found to have a platelet count of 430 on 7/15/2024.  He then underwent a L4-L5 decompression and fusion at the [facility] on 7/16/2024.  His platelet count on 7/31/2024 was 674 and then again on 9/23/2024 was 460.    He overall has been doing well.  Denies any history of blood clots.  Denies any headaches.  Does endorse dizziness.  Does have chest pain on and off and was evaluated recently with a EKG.  Denies any weight loss or appetite loss.  Denies any night sweats.    REVIEW OF SYSTEMS  A 12-point ROS negative except as in HPI      Current Outpatient Medications   Medication Sig Dispense Refill    acetaminophen (TYLENOL) 500 MG tablet Take 1,000 mg by mouth.      buPROPion (WELLBUTRIN XL) 150 MG 24 hr tablet Take 1 tablet by mouth daily at 2 pm      buPROPion (WELLBUTRIN XL) 300 MG 24 hr tablet Take 300 mg by mouth every morning.      gabapentin (NEURONTIN) 300 MG capsule Take 900 mg by mouth 3 times daily.      amLODIPine (NORVASC) 5 MG tablet Take 1 tablet (5 mg) by mouth daily. (Patient not taking: Reported on 10/18/2024) 90 tablet 0    [START ON 10/21/2024] bisacodyl (DULCOLAX) 5 MG EC tablet Take as directed by colonoscopy prep instructions (Patient not taking: Reported on 9/23/2024) 2 tablet 0    [START ON 10/21/2024] polyethylene glycol-electrolytes (NULYTELY) 420 g solution Take 4,000 mLs by mouth once for 1 dose (Patient not taking: Reported on 9/23/2024) 4000 mL 0    sildenafil (VIAGRA) 50 MG tablet TAKE 1/2 TO 1 TABLET BY MOUTH DAILY AT LEAST 1 HOUR BEFORE SEXUAL ACTIVITY AS NEEDED         Allergies   Allergen Reactions    Latex Other (See Comments) and Anaphylaxis     Head congestion, sinus concerns, rash if there is contact with  skin     Immunization History   Administered Date(s) Administered    COVID-19 Monovalent 18+ (Moderna) 01/21/2021, 02/18/2021    Flu, Unspecified 11/07/2005    [id number] Novel Flu 11/20/2009    Influenza Vaccine (Flucelvax Quadrivalent) 10/29/2019    Influenza Vaccine, 6+MO IM (QUADRIVALENT W/PRESERVATIVES) 11/27/2018    TDAP Vaccine (Boostrix) 09/16/2014    Twinrix A/B 11/11/2011       Past Medical History:   Diagnosis Date    Chronic sprain or strain of lumbar region     Overview:  hx of flares    Closed fracture of talus     6/15/04,Question of    History of severe acute respiratory syndrome coronavirus 2 (SARS-CoV-2) disease 07/01/2024    Formatting of this note might be different from the original.   COVID history 2020.  Fully recovered without residual affects.  No hospitalization/treatment/O2 history.  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      Pneumonia     2/17/05       Past Surgical History:   Procedure Laterality Date     L4-5 TLIF, left L5-S1 diskectomy.  07/2024    [facility]    EXTRACTION(S) DENTAL      HERNIA REPAIR, INGUINAL RT/LT Left        SOCIAL HISTORY  History   Smoking Status    Former    Types: Cigarettes   Smokeless Tobacco    Never    Social History    Substance and Sexual Activity      Alcohol use: Yes        Comment: 3 per week     History   Drug Use No       FAMILY HISTORY  Family History   Problem Relation Age of Onset    Heart Disease Father 65        Heart Disease,CAD with stents    Substance Abuse Father         Alcohol/Drug,Smoker    Other - See Comments Father         Overweight    Liver Disease Father         Liver disease    Lymphoma Father         Hodkins Lymphoma    Heart Disease Maternal Grandfather 55        Heart Disease,MI    Substance Abuse Maternal Grandfather         Alcohol/Drug,Smoker    Other - See Comments Mother         Fibromyalgia/Scoliosis/Low back DJD    No Known Problems Sister     No Known Problems Brother     Diabetes No family hx of          "Diabetes    Colon Cancer No family hx of         Cancer-colon    Prostate Cancer No family hx of        PHYSICAL EXAMINATION  BP (!) 132/100   Pulse 70   Temp 97.7  F (36.5  C) (Tympanic)   Resp 16   Ht 1.744 m (5' 8.66\")   Wt 80.3 kg (177 lb)   SpO2 96%   BMI 26.40 kg/m    Wt Readings from Last 2 Encounters:   10/18/24 80.3 kg (177 lb)   09/23/24 78.6 kg (173 lb 3.2 oz)     Physical Exam  Constitutional:       Appearance: Normal appearance.   Pulmonary:      Effort: Pulmonary effort is normal.   Neurological:      General: No focal deficit present.      Mental Status: He is alert and oriented to person, place, and time.     Laboratory and imaging:     Latest Reference Range & Units 10/18/24 11:39   WBC 4.0 - 11.0 10e3/uL 5.6   Hemoglobin 13.3 - 17.7 g/dL 14.8   Hematocrit 40.0 - 53.0 % 44.4   Platelet Count 150 - 450 10e3/uL 397       ASSESSMENT AND PLAN    1.  Thrombocytosis:    Found to have a platelet count of 430 on 7/15/2024 on labs done in [place].  He then underwent a L4-L5 decompression and fusion at [facility] on 7/16/2024.  His platelet count was elevated postsurgery on 7/31/2024 at 674 and then again was mildly elevated on 9/23/2024 at 460.    This is very likely postsurgical reactive thrombocytosis.  He had a JAK2, ALEX R and MPL NGS testing done which was negative.  Therefore this is not a underlying bone marrow issue.    Blood work done today in clinic on 10/18/2024 showed a platelet count of 397.  Therefore his thrombocytosis has improved.    No further testing required follow-up with hematology as needed    Total time spent on the patient on day of encounter was 45 minutes doing chart review, review of test results, interpretation of results, patient visit and documentation.       Katina Mott MD       "

## 2024-10-28 ENCOUNTER — ANESTHESIA (OUTPATIENT)
Dept: SURGERY | Facility: OTHER | Age: 49
End: 2024-10-28
Payer: COMMERCIAL

## 2024-10-28 ENCOUNTER — ANESTHESIA EVENT (OUTPATIENT)
Dept: SURGERY | Facility: OTHER | Age: 49
End: 2024-10-28
Payer: COMMERCIAL

## 2024-10-28 ENCOUNTER — HOSPITAL ENCOUNTER (OUTPATIENT)
Facility: OTHER | Age: 49
Discharge: HOME | End: 2024-10-28
Attending: SURGERY | Admitting: SURGERY
Payer: COMMERCIAL

## 2024-10-28 VITALS
WEIGHT: 165 LBS | DIASTOLIC BLOOD PRESSURE: 78 MMHG | HEIGHT: 69 IN | OXYGEN SATURATION: 97 % | SYSTOLIC BLOOD PRESSURE: 115 MMHG | BODY MASS INDEX: 24.44 KG/M2 | HEART RATE: 85 BPM | TEMPERATURE: 97.8 F | RESPIRATION RATE: 16 BRPM

## 2024-10-28 PROBLEM — K57.30 SIGMOID DIVERTICULOSIS: Status: ACTIVE | Noted: 2024-10-28

## 2024-10-28 PROCEDURE — 45378 DIAGNOSTIC COLONOSCOPY: CPT | Performed by: NURSE ANESTHETIST, CERTIFIED REGISTERED

## 2024-10-28 PROCEDURE — 250N000011 HC RX IP 250 OP 636: Performed by: NURSE ANESTHETIST, CERTIFIED REGISTERED

## 2024-10-28 PROCEDURE — 45378 DIAGNOSTIC COLONOSCOPY: CPT | Performed by: SURGERY

## 2024-10-28 PROCEDURE — G0121 COLON CA SCRN NOT HI RSK IND: HCPCS | Performed by: SURGERY

## 2024-10-28 PROCEDURE — 258N000003 HC RX IP 258 OP 636: Performed by: SURGERY

## 2024-10-28 PROCEDURE — 250N000009 HC RX 250: Performed by: NURSE ANESTHETIST, CERTIFIED REGISTERED

## 2024-10-28 RX ORDER — ONDANSETRON 2 MG/ML
4 INJECTION INTRAMUSCULAR; INTRAVENOUS
Status: DISCONTINUED | OUTPATIENT
Start: 2024-10-28 | End: 2024-10-28 | Stop reason: HOSPADM

## 2024-10-28 RX ORDER — LIDOCAINE 40 MG/G
CREAM TOPICAL
Status: DISCONTINUED | OUTPATIENT
Start: 2024-10-28 | End: 2024-10-28 | Stop reason: HOSPADM

## 2024-10-28 RX ORDER — NALOXONE HYDROCHLORIDE 0.4 MG/ML
0.2 INJECTION, SOLUTION INTRAMUSCULAR; INTRAVENOUS; SUBCUTANEOUS
Status: DISCONTINUED | OUTPATIENT
Start: 2024-10-28 | End: 2024-10-28 | Stop reason: HOSPADM

## 2024-10-28 RX ORDER — NALOXONE HYDROCHLORIDE 0.4 MG/ML
0.4 INJECTION, SOLUTION INTRAMUSCULAR; INTRAVENOUS; SUBCUTANEOUS
Status: DISCONTINUED | OUTPATIENT
Start: 2024-10-28 | End: 2024-10-28 | Stop reason: HOSPADM

## 2024-10-28 RX ORDER — SODIUM CHLORIDE, SODIUM LACTATE, POTASSIUM CHLORIDE, CALCIUM CHLORIDE 600; 310; 30; 20 MG/100ML; MG/100ML; MG/100ML; MG/100ML
INJECTION, SOLUTION INTRAVENOUS CONTINUOUS
Status: DISCONTINUED | OUTPATIENT
Start: 2024-10-28 | End: 2024-10-28 | Stop reason: HOSPADM

## 2024-10-28 RX ORDER — FLUMAZENIL 0.1 MG/ML
0.2 INJECTION, SOLUTION INTRAVENOUS
Status: DISCONTINUED | OUTPATIENT
Start: 2024-10-28 | End: 2024-10-28 | Stop reason: HOSPADM

## 2024-10-28 RX ORDER — LIDOCAINE HYDROCHLORIDE 20 MG/ML
INJECTION, SOLUTION INFILTRATION; PERINEURAL PRN
Status: DISCONTINUED | OUTPATIENT
Start: 2024-10-28 | End: 2024-10-28

## 2024-10-28 RX ORDER — PROPOFOL 10 MG/ML
INJECTION, EMULSION INTRAVENOUS PRN
Status: DISCONTINUED | OUTPATIENT
Start: 2024-10-28 | End: 2024-10-28

## 2024-10-28 RX ORDER — PROPOFOL 10 MG/ML
INJECTION, EMULSION INTRAVENOUS CONTINUOUS PRN
Status: DISCONTINUED | OUTPATIENT
Start: 2024-10-28 | End: 2024-10-28

## 2024-10-28 RX ADMIN — PROPOFOL 120 MG: 10 INJECTION, EMULSION INTRAVENOUS at 11:57

## 2024-10-28 RX ADMIN — SODIUM CHLORIDE, POTASSIUM CHLORIDE, SODIUM LACTATE AND CALCIUM CHLORIDE: 600; 310; 30; 20 INJECTION, SOLUTION INTRAVENOUS at 11:05

## 2024-10-28 RX ADMIN — LIDOCAINE HYDROCHLORIDE 40 MG: 20 INJECTION, SOLUTION INFILTRATION; PERINEURAL at 11:57

## 2024-10-28 RX ADMIN — PROPOFOL 175 MCG/KG/MIN: 10 INJECTION, EMULSION INTRAVENOUS at 11:57

## 2024-10-28 ASSESSMENT — ACTIVITIES OF DAILY LIVING (ADL)
ADLS_ACUITY_SCORE: 0

## 2024-10-28 NOTE — ANESTHESIA PREPROCEDURE EVALUATION
Anesthesia Pre-Procedure Evaluation    Patient: Salomón Edwards   MRN: [id number] : 1975        Procedure : Procedure(s):  Colonoscopy          Past Medical History:   Diagnosis Date    Chronic sprain or strain of lumbar region     Overview:  hx of flares    Closed fracture of talus     6/15/04,Question of    History of severe acute respiratory syndrome coronavirus 2 (SARS-CoV-2) disease 2024    Formatting of this note might be different from the original.   COVID history .  Fully recovered without residual affects.  No hospitalization/treatment/O2 history.  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      Pneumonia     05      Past Surgical History:   Procedure Laterality Date     L4-5 TLIF, left L5-S1 diskectomy.  2024    [facility]    EXTRACTION(S) DENTAL      HERNIA REPAIR, INGUINAL RT/LT Left       Allergies   Allergen Reactions    Latex Other (See Comments) and Anaphylaxis     Head congestion, sinus concerns, rash if there is contact with skin      Social History     Tobacco Use    Smoking status: Former     Current packs/day: 0.00     Average packs/day: 0.3 packs/day for 11.0 years (2.8 ttl pk-yrs)     Types: Cigarettes     Start date: 1988     Quit date: 1999     Years since quittin.7     Passive exposure: Past    Smokeless tobacco: Never    Tobacco comments:     Passive exposure in childhood home.    Substance Use Topics    Alcohol use: Yes     Comment: 3 per week      Wt Readings from Last 1 Encounters:   10/28/24 74.8 kg (165 lb)        Anesthesia Evaluation   Pt has had prior anesthetic.     No history of anesthetic complications       ROS/MED HX  ENT/Pulmonary:  - neg pulmonary ROS     Neurologic:  - neg neurologic ROS     Cardiovascular:  - neg cardiovascular ROS     METS/Exercise Tolerance: >4 METS    Hematologic:  - neg hematologic  ROS     Musculoskeletal:  - neg musculoskeletal ROS     GI/Hepatic:  - neg GI/hepatic ROS     Renal/Genitourinary:  -  "neg Renal ROS     Endo:  - neg endo ROS     Psychiatric/Substance Use:     (+) psychiatric history anxiety and depression       Infectious Disease:  - neg infectious disease ROS     Malignancy:  - neg malignancy ROS     Other:  - neg other ROS          Physical Exam    Airway        Mallampati: II   TM distance: > 3 FB   Neck ROM: full   Mouth opening: > 3 cm    Respiratory Devices and Support         Dental       (+) Minor Abnormalities - some fillings, tiny chips      Cardiovascular   cardiovascular exam normal       Rhythm and rate: regular and normal     Pulmonary   pulmonary exam normal        breath sounds clear to auscultation           OUTSIDE LABS:  CBC:   Lab Results   Component Value Date    WBC 5.6 10/18/2024    WBC 5.0 09/23/2024    HGB 14.8 10/18/2024    HGB 16.4 09/23/2024    HCT 44.4 10/18/2024    HCT 49.2 09/23/2024     10/18/2024     (H) 09/23/2024     BMP:   Lab Results   Component Value Date     09/23/2024     08/09/2023    POTASSIUM 4.3 09/23/2024    POTASSIUM 4.2 08/09/2023    CHLORIDE 100 09/23/2024    CHLORIDE 100 08/09/2023    CO2 27 09/23/2024    CO2 27 08/09/2023    BUN 15.6 09/23/2024    BUN 15.1 08/09/2023    CR 0.85 09/23/2024    CR 0.87 08/09/2023     (H) 09/23/2024     (H) 08/09/2023     COAGS: No results found for: \"PTT\", \"INR\", \"FIBR\"  POC: No results found for: \"BGM\", \"HCG\", \"HCGS\"  HEPATIC:   Lab Results   Component Value Date    ALBUMIN 4.9 09/23/2024    PROTTOTAL 7.7 09/23/2024    ALT 31 09/23/2024    AST 25 09/23/2024    ALKPHOS 100 09/23/2024    BILITOTAL 0.4 09/23/2024     OTHER:   Lab Results   Component Value Date    A1C 5.2 08/09/2023    ALEX 10.0 09/23/2024    TSH 2.23 08/09/2023       Anesthesia Plan    ASA Status:  2    NPO Status:  NPO Appropriate    Anesthesia Type: MAC.   Induction: Propofol.   Maintenance: Balanced.        Consents    Anesthesia Plan(s) and associated risks, benefits, and realistic alternatives discussed. " Questions answered and patient/representative(s) expressed understanding.     - Discussed: Risks, Benefits and Alternatives for BOTH SEDATION and the PROCEDURE were discussed     - Discussed with:  Patient      - Extended Intubation/Ventilatory Support Discussed: No.      - Patient is DNR/DNI Status: No     Use of blood products discussed: No .     Postoperative Care            Comments:               JOE HUNT CRNA    I have reviewed the pertinent notes and labs in the chart from the past 30 days and (re)examined the patient.  Any updates or changes from those notes are reflected in this note.

## 2024-10-28 NOTE — OP NOTE
PROCEDURE NOTE    DATE OF SERVICE: 10/28/2024    SURGEON: Madan Becker MD    PRE-OP DIAGNOSIS:    Healthcare maintenance   Screening      POST-OP DIAGNOSIS:  Same  Sigmoid Diverticulosis      PROCEDURE:   Colonoscopy      ANESTHESIA:  ALBERT Turner CRNA    INDICATION FOR THE PROCEDURE: The patient is a 49 year old male in need of Healthcare maintenance  . The patient has no other complaints  . After explaining the risks to include bleeding, perforation, potential inability toreach the cecum, the patient wished to proceed.    PROCEDURE:After adequate sedation, the patient was in the left lateral decubitus position.  Rectal exam was performed.  There was normal tone and no palpable masses .  The colonoscope was introduced into the rectum and advanced to the cecum with Mild difficulty.  The patient's prep was good.  The terminal cecum was reached.  The cecum, ascending, transverse, descending and sigmoid colon was with mild sigmoid diverticulosis .  The scope was retroflexed in the rectum.  The rectum was unremarkable  .  The scope was straightened and removed.  The patient tolerated the procedure well.     ESTIMATED BLOOD LOSS: none    COMPLICATIONS:  None    TISSUE REMOVED:  No    RECOMMEND:      Follow-up in 10 years  Fiber  Given literature on diverticulosis    Madan Becker MD FACS

## 2024-10-28 NOTE — H&P
History and Physical    CHIEF COMPLAINT / REASON FOR PROCEDURE:  Healthcare maintenance     PERTINENT HISTORY   Patient is a 49 year old male who presents today for screening colonoscopy for Healthcare maintenance .   First colonoscopy.  Patient has no complaints.    Past Medical History:   Diagnosis Date    Chronic sprain or strain of lumbar region     Overview:  hx of flares    Closed fracture of talus     6/15/04,Question of    History of severe acute respiratory syndrome coronavirus 2 (SARS-CoV-2) disease 07/01/2024    Formatting of this note might be different from the original.   COVID history 2020.  Fully recovered without residual affects.  No hospitalization/treatment/O2 history.  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      Pneumonia     2/17/05     Past Surgical History:   Procedure Laterality Date     L4-5 TLIF, left L5-S1 diskectomy.  07/2024    [facility]    EXTRACTION(S) DENTAL      HERNIA REPAIR, INGUINAL RT/LT Left        Bleeding tendencies:  No    ALLERGIES/SENSITIVITIES:   Allergies   Allergen Reactions    Latex Other (See Comments) and Anaphylaxis     Head congestion, sinus concerns, rash if there is contact with skin        CURRENT MEDICATIONS:    Prior to Admission medications    Medication Sig Start Date End Date Taking? Authorizing Provider   acetaminophen (TYLENOL) 500 MG tablet Take 1,000 mg by mouth. 7/17/24  Yes Reported, Patient   amLODIPine (NORVASC) 5 MG tablet Take 1 tablet (5 mg) by mouth daily. 9/23/24  Yes Aby Robles APRN CNP   bisacodyl (DULCOLAX) 5 MG EC tablet Take as directed by colonoscopy prep instructions 10/21/24  Yes Madan Becker MD   buPROPion (WELLBUTRIN XL) 150 MG 24 hr tablet Take 1 tablet by mouth every morning. 1/18/23  Yes Reported, Patient   gabapentin (NEURONTIN) 300 MG capsule Take 900 mg by mouth 3 times daily. 8/19/24  Yes Reported, Patient   buPROPion (WELLBUTRIN XL) 300 MG 24 hr tablet Take 300 mg by mouth every morning. 8/14/24    "Reported, Patient       Physical Exam:  Temp 97.6  F (36.4  C) (Tympanic)   Ht 1.744 m (5' 8.66\")   Wt 74.8 kg (165 lb)   SpO2 97%   BMI 24.61 kg/m    EXAM:  Chest/Respiratory Exam: Normal - Clear to auscultation without rales, rhonchi, or wheezing.  Cardiovascular Exam: regular rate and rhythm        PLAN: COLONOSCOPY .  Patient understands risks of bleeding, perforation, potential inability to reach cecum, aspiration and wishes to proceed.      "

## 2024-10-28 NOTE — OR NURSING
Salomón has been discharged to home at 1302 via ambulatory accompanied by ELENA Conklin    Written discharge instructions were provided to patient.  Prescriptions were N/A.  Patient states their pain is zero, and denies any nausea or dizziness upon discharge.    Patient and adult caring for them verbalize understanding of discharge instructions including no driving until tomorrow and no longer taking narcotic pain medications - no operating mechanical equipment and no making any important decisions.They understand reason for discharge, and necessary follow-up appointments.      Katerin Castro RN on 10/28/2024 at 1:09 PM

## 2024-10-28 NOTE — DISCHARGE INSTRUCTIONS
[facility]  Adult Discharge Orders & Instructions      For 24 hours after surgery:  Get plenty of rest.  A responsible adult must stay with you for at least 24 hours after you leave the hospital.   You may feel lightheaded.  IF so, sit for a few minutes before standing.  Have someone help you get up.   You may have a slight fever. Call the doctor if your fever is over 101 F (38.3 C) (taken under the tongue) or lasts longer than 24 hours.  You may have a dry mouth, a sore throat, muscle aches or trouble sleeping.  These should go away after 24 hours.  Do not make important or legal decisions.  6.   Do not drive or use heavy equipment.  If you have weakness or tingling, don't drive or use heavy equipment until this feeling goes away.  To contact a doctor, call    [phone]______________

## 2024-10-28 NOTE — ANESTHESIA POSTPROCEDURE EVALUATION
Patient: Salomnó Edwards    Procedure: Procedure(s):  Colonoscopy       Anesthesia Type:  MAC    Note:  Disposition: ICU            ICU Sign Out: Anesthesiologist/ICU physician sign out WAS performed   Postop Pain Control: Uneventful            Sign Out: Well controlled pain   PONV: No   Neuro/Psych: Uneventful            Sign Out: Acceptable/Baseline neuro status   Airway/Respiratory: Uneventful            Sign Out: Acceptable/Baseline resp. status   CV/Hemodynamics: Uneventful            Sign Out: Acceptable CV status; No obvious hypovolemia; No obvious fluid overload   Other NRE: NONE   DID A NON-ROUTINE EVENT OCCUR? No           Last vitals:  Vitals Value Taken Time   /78 10/28/24 1245   Temp 97.8  F (36.6  C) 10/28/24 1223   Pulse 85 10/28/24 1245   Resp 16 10/28/24 1230   SpO2 99 % 10/28/24 1248   Vitals shown include unfiled device data.    Electronically Signed By: JOE HUNT CRNA  October 28, 2024  12:49 PM

## 2024-10-28 NOTE — ANESTHESIA CARE TRANSFER NOTE
Patient: Salomón Edwards    Procedure: Procedure(s):  Colonoscopy       Diagnosis: Colon cancer screening [Z12.11]  Diagnosis Additional Information: No value filed.    Anesthesia Type:   MAC     Note:    Oropharynx: oropharynx clear of all foreign objects and spontaneously breathing  Level of Consciousness: drowsy  Oxygen Supplementation: room air    Independent Airway: airway patency satisfactory and stable  Dentition: dentition unchanged  Vital Signs Stable: post-procedure vital signs reviewed and stable  Report to RN Given: handoff report given  Patient transferred to: Phase II    Handoff Report: Identifed the Patient, Identified the Reponsible Provider, Reviewed the pertinent medical history, Discussed the surgical course, Reviewed Intra-OP anesthesia mangement and issues during anesthesia, Set expectations for post-procedure period and Allowed opportunity for questions and acknowledgement of understanding      Vitals:  Vitals Value Taken Time   BP     Temp     Pulse     Resp     SpO2 99 % 10/28/24 1225   Vitals shown include unfiled device data.    Electronically Signed By: JOE Montgomery CRNA  October 28, 2024  12:26 PM

## 2024-11-04 ENCOUNTER — THERAPY VISIT (OUTPATIENT)
Dept: PHYSICAL THERAPY | Facility: OTHER | Age: 49
End: 2024-11-04
Payer: COMMERCIAL

## 2024-11-04 DIAGNOSIS — Z98.890 STATUS POST DISCECTOMY: ICD-10-CM

## 2024-11-04 PROCEDURE — 97161 PT EVAL LOW COMPLEX 20 MIN: CPT | Mod: GP,PO

## 2024-11-04 PROCEDURE — 97110 THERAPEUTIC EXERCISES: CPT | Mod: GP,PO

## 2024-11-06 ENCOUNTER — THERAPY VISIT (OUTPATIENT)
Dept: PHYSICAL THERAPY | Facility: OTHER | Age: 49
End: 2024-11-06
Payer: COMMERCIAL

## 2024-11-06 DIAGNOSIS — Z98.890 STATUS POST DISCECTOMY: Primary | ICD-10-CM

## 2024-11-06 PROCEDURE — 97110 THERAPEUTIC EXERCISES: CPT | Mod: GP,PO

## 2024-11-11 ENCOUNTER — THERAPY VISIT (OUTPATIENT)
Dept: PHYSICAL THERAPY | Facility: OTHER | Age: 49
End: 2024-11-11
Payer: COMMERCIAL

## 2024-11-11 DIAGNOSIS — Z98.890 STATUS POST DISCECTOMY: Primary | ICD-10-CM

## 2024-11-11 PROCEDURE — 97110 THERAPEUTIC EXERCISES: CPT | Mod: GP,PO

## 2024-11-13 ENCOUNTER — THERAPY VISIT (OUTPATIENT)
Dept: PHYSICAL THERAPY | Facility: OTHER | Age: 49
End: 2024-11-13
Payer: COMMERCIAL

## 2024-11-13 DIAGNOSIS — Z98.890 STATUS POST DISCECTOMY: Primary | ICD-10-CM

## 2024-11-13 PROCEDURE — 97110 THERAPEUTIC EXERCISES: CPT | Mod: GP,PO

## 2024-11-19 ENCOUNTER — THERAPY VISIT (OUTPATIENT)
Dept: CHIROPRACTIC MEDICINE | Facility: OTHER | Age: 49
End: 2024-11-19
Attending: CHIROPRACTOR
Payer: COMMERCIAL

## 2024-11-19 VITALS
HEART RATE: 69 BPM | TEMPERATURE: 96.9 F | OXYGEN SATURATION: 98 % | SYSTOLIC BLOOD PRESSURE: 150 MMHG | DIASTOLIC BLOOD PRESSURE: 90 MMHG

## 2024-11-19 DIAGNOSIS — M25.562 ACUTE PAIN OF LEFT KNEE: ICD-10-CM

## 2024-11-19 DIAGNOSIS — M54.2 NECK PAIN: ICD-10-CM

## 2024-11-19 DIAGNOSIS — M99.02 SEGMENTAL AND SOMATIC DYSFUNCTION OF THORACIC REGION: Primary | ICD-10-CM

## 2024-11-19 DIAGNOSIS — M54.6 MIDLINE THORACIC BACK PAIN, UNSPECIFIED CHRONICITY: ICD-10-CM

## 2024-11-19 DIAGNOSIS — Z98.1 S/P LUMBAR FUSION: ICD-10-CM

## 2024-11-19 NOTE — PROGRESS NOTES
"Neck is occasionally aching 3/10 W24 5/10. Doing stretches with a decrease in pain. Medial upper back is constantly cramping 4/10 W24 6/10. Left knee is frequently stabbing 8/10 W24 8/10.  Doing stretches with no decrease in pain.   Vika Cota on 11/19/2024 at 9:53 AM     Reviewed by EW    Visit #:  1    Subjective:  Salomón Edwards is a 49 year old male who is seen in f/u up for:        Segmental and somatic dysfunction of thoracic region  S/P lumbar fusion  Neck pain  Midline thoracic back pain, unspecified chronicity  Acute pain of left knee.     Salomón Edwards reports: Able to undergo back surgery this past July.  Healing has been slow and patient is still dealing with some right sided nerve and weakness symptoms but currently working with physical therapy.    Reports that mid back and neck feel painful\" locked up.\"  No apparent radiculopathy of the upper extremities.    Left knee also quite painful.  Reports noticing this after stepping in and out of his boat repeatedly and going fishing.  Some crepitus is noted.  Leg does feel somewhat weak and painful with weightbearing.  No obvious catching, or bruising/swelling noted.      (DVPRS) Pain Rating Score : 8-Awful, hard to do anything (W24 8/10) (11/19/24 0948)     Objective:  The following was observed:  BP (!) 150/90 (BP Location: Right arm, Patient Position: Sitting, Cuff Size: Adult Regular)   Pulse 69   Temp 96.9  F (36.1  C) (Tympanic)   SpO2 98%        11/19/2024     9:54 AM   Neck Disability Index (  Juan [employer] H. and [employer]. 1991. All rights reserved.; used with permission)   SECTION 1 - PAIN INTENSITY 1   SECTION 2 - PERSONAL CARE 0   SECTION 3 - LIFTING 0   SECTION 4 - READING 0   SECTION 5 - HEADACHES 0   SECTION 6 - CONCENTRATION 0   SECTION 7 - WORK 1   SECTION 8 - DRIVING 1   SECTION 9 - SLEEPING 0   SECTION 10 - RECREATION 0   Count 10   Sum 3   Raw Score: /50 3   Neck Disability Index Score: (%) 6 %      Cervical AROM: " Unremarkable    Oswestry (MARISELA) Questionnaire        11/19/2024     9:55 AM   OSWESTRY DISABILITY INDEX   Count 9   Sum 3   Oswestry Score (%) 6.67 %       Thoracic/lumbar AROM: Mild rotation restriction otherwise unremarkable          11/19/2024     9:56 AM   LEFS  ( Maurisio Walters: Used with Permission)   a. Any of your usual work, housework, or school activities. 1-Quite a Bit of Difficulty   b. Your usual hobbies, recreational or sporting activities.  1-Quite a Bit of Difficulty   c. Getting into or out of the bath. 2-Moderate Difficulty   d. Walking between rooms. 2   e. Putting on your shoes or socks. 3   f. Squatting. 1   g. Lifting an object, like a bag of groceries from the floor.  1   h. Performing light activities around your home.  1   i. Performing heavy activities around your home. 1   j. Getting into or out of a car. 3   k. Walking 2 blocks. 1   l. Walking a mile. 0   m. Going up or down 10 stairs (about 1 flight of stairs). 0   n. Standing for 1 hour. 1   o. Sitting for 1 hour. 4   p. Running on even ground. 0   q. Running on uneven ground. 0   r. Making sharp turns while running fast. 0   s. Hopping. 0   t. Rolling over in bed. 4   Column Totals: /80 26      Anterior drawer sign: Negative bilaterally  Posterior drawer sign: Negative bilaterally  Medial stress test: Negative bilaterally  Lateral stress test: Negative bilaterally, mild laxity noted on left  Martina: Negative bilaterally    No palpatory tenderness to the MCL, PCL, patellar tendon, mild noted of the left VMO    P: palpatory tenderness T4, T9, suboccipitals bilaterally:    A: static palpation demonstrates intersegmental asymmetry , thoracic  R: motion palpation notes restricted motion, T4  and T9   T: muscle spasm at level(s):  Suboccipitals bilaterally, thoracic paraspinals, rhomboids bilaterally:      Segmental spinal dysfunction/restrictions found at:  :  T4 Extension restriction  T9 Extension restriction.      Assessment: Mid back  concerns appear to be in someway contributed to by segmental/somatic dysfunction of the thoracic spine.  Cervical spine shows no signs of segmental/somatic dysfunction.  Suspect this is secondary to post status of lumbar fusion.  Neck pain appears to be muscular in nature.  Plan to follow-up with patient if needed in the next 2-4 weeks.  Patient is aware that I will be unavailable during that time and that if he needs Dr. Hartman my colleague will be covering.    Left knee: Slight laxity noted of the LCL.  Suspect possible muscle strain as patient has been less active since recent back surgery.  DDx could also include occult tear of the meniscus.  Patient will monitor symptoms for the next couple of days and if no improvement noted patient will follow-up with medical provider for further assessment and treatment.    Diagnoses:      1. Segmental and somatic dysfunction of thoracic region    2. S/P lumbar fusion    3. Neck pain    4. Midline thoracic back pain, unspecified chronicity    5. Acute pain of left knee        Patient's condition:  Patient had restrictions pre-manipulation    Treatment effectiveness:  Post manipulation there is better intersegmental movement and Patient claims to feel looser post manipulation      Procedures:  E/M [id number]    CMT:  43109 Chiropractic manipulative treatment 1-2 regions performed   Thoracic: Diversified, T4, T9, Prone    Modalities:  81771: MSTM:  To Sub-occipital  for 4 min    Therapeutic procedures:  None    Response to Treatment  Reduction in symptoms as reported by patient    Prognosis: Good    Progress towards Goals: Reduce pain symptoms by at least 50% within the next 2-4 weeks  Improve disability index scores by at least 10-20% within the next 2-4 weeks  Refer patient for further imaging/consulting if left knee pain continues     Recommendations:    Instructions: Utilize ice and heat as needed for up to 20 minutes every 2 hours for any pain.  Consider Ace bandage of the left  knee.  If no improvement noted in the next couple of days follow-up with medical provider    Follow-up:  Return to care if symptoms persist.

## (undated) DEVICE — ENDO BRUSH CHANNEL MASTER CLEANING 2-4.2MM BW-412T

## (undated) DEVICE — ENDO KIT COMPLIANCE DYKENDOCMPLY

## (undated) DEVICE — TUBING SUCTION 10'X3/16" N510

## (undated) DEVICE — SUCTION MANIFOLD NEPTUNE 2 SYS 4 PORT 0702-020-000

## (undated) DEVICE — SOL WATER 1500ML

## (undated) RX ORDER — DEXAMETHASONE SODIUM PHOSPHATE 10 MG/ML
INJECTION, SOLUTION INTRAMUSCULAR; INTRAVENOUS
Status: DISPENSED
Start: 2021-05-13

## (undated) RX ORDER — PROPOFOL 10 MG/ML
INJECTION, EMULSION INTRAVENOUS
Status: DISPENSED
Start: 2024-10-28

## (undated) RX ORDER — LIDOCAINE HYDROCHLORIDE 10 MG/ML
INJECTION, SOLUTION EPIDURAL; INFILTRATION; INTRACAUDAL; PERINEURAL
Status: DISPENSED
Start: 2021-05-13

## (undated) RX ORDER — LIDOCAINE HYDROCHLORIDE 10 MG/ML
INJECTION, SOLUTION INFILTRATION; PERINEURAL
Status: DISPENSED
Start: 2021-05-13

## (undated) RX ORDER — LIDOCAINE HYDROCHLORIDE 10 MG/ML
INJECTION, SOLUTION INFILTRATION; PERINEURAL
Status: DISPENSED
Start: 2019-01-17